# Patient Record
Sex: FEMALE | Race: WHITE | Employment: PART TIME | URBAN - METROPOLITAN AREA
[De-identification: names, ages, dates, MRNs, and addresses within clinical notes are randomized per-mention and may not be internally consistent; named-entity substitution may affect disease eponyms.]

---

## 2017-01-09 ENCOUNTER — HOSPITAL ENCOUNTER (OUTPATIENT)
Age: 32
Discharge: HOME | End: 2017-01-09
Attending: EMERGENCY MEDICINE
Payer: COMMERCIAL

## 2017-01-09 VITALS
HEART RATE: 81 BPM | WEIGHT: 120 LBS | HEIGHT: 64 IN | BODY MASS INDEX: 20.49 KG/M2 | OXYGEN SATURATION: 100 % | DIASTOLIC BLOOD PRESSURE: 78 MMHG | TEMPERATURE: 98 F | RESPIRATION RATE: 14 BRPM | SYSTOLIC BLOOD PRESSURE: 116 MMHG

## 2017-01-09 DIAGNOSIS — R10.32 LEFT LOWER QUADRANT PAIN: Primary | ICD-10-CM

## 2017-01-09 LAB
B-HCG UR QL: NEGATIVE
URINE BILIRUBIN: NEGATIVE
URINE BLOOD: NEGATIVE
URINE CLARITY: CLEAR
URINE COLOR: YELLOW
URINE GLUCOSE: NEGATIVE MG/DL
URINE KETONES: NEGATIVE MG/DL
URINE LEUKOCYTE ESTERASE: NEGATIVE
URINE NITRITE: NEGATIVE
URINE PH: 6.5
URINE PROTEIN: NEGATIVE MG /DL
URINE SPECIFIC GRAVITY: 1.01
URINE UROBILINOGEN: 0.2 MG/DL

## 2017-01-09 PROCEDURE — 99203 OFFICE O/P NEW LOW 30 MIN: CPT

## 2017-01-09 PROCEDURE — 81025 URINE PREGNANCY TEST: CPT

## 2017-01-09 PROCEDURE — 81002 URINALYSIS NONAUTO W/O SCOPE: CPT

## 2017-01-09 PROCEDURE — 99212 OFFICE O/P EST SF 10 MIN: CPT

## 2017-01-09 NOTE — ED PROVIDER NOTES
Patient Seen in: [address]    History   Patient presents with:  Abdomen/Flank Pain (GI/)    Stated Complaint: LT SIDE STOMACH PAIN    HPI    Patient is a 25-year-old female who complains of some left lower quadrant leslie Paternal Grandfather    • Cancer Paternal Grandmother          Smoking Status: Never Smoker                      Alcohol Use: Yes           0.0 oz/week       0 Standard drinks or equivalent per week       Comment: SS      Review of Systems    Positive for sensory defecits noted Coordination normal.   Skin: Skin is warm and dry. Psychiatric: Normal mood and affect. Behavior is normal. Judgment and thought content normal.   Nursing note and vitals reviewed.           ED Course     [facility]ed   [facility]

## 2017-01-10 ENCOUNTER — OFFICE VISIT (OUTPATIENT)
Dept: INTERNAL MEDICINE CLINIC | Facility: CLINIC | Age: 32
End: 2017-01-10

## 2017-01-10 VITALS
DIASTOLIC BLOOD PRESSURE: 66 MMHG | HEIGHT: 64 IN | OXYGEN SATURATION: 97 % | TEMPERATURE: 98 F | BODY MASS INDEX: 20.49 KG/M2 | SYSTOLIC BLOOD PRESSURE: 100 MMHG | HEART RATE: 85 BPM | WEIGHT: 120 LBS | RESPIRATION RATE: 17 BRPM

## 2017-01-10 DIAGNOSIS — R10.32 LLQ ABDOMINAL PAIN: Primary | ICD-10-CM

## 2017-01-10 LAB
ALBUMIN SERPL BCP-MCNC: 4.1 G/DL (ref 3.5–4.8)
ALBUMIN/GLOB SERPL: 2 {RATIO} (ref 1–2)
ALP SERPL-CCNC: 37 U/L (ref 32–100)
ALT SERPL-CCNC: 12 U/L (ref 14–54)
ANION GAP SERPL CALC-SCNC: 6 MMOL/L (ref 0–18)
AST SERPL-CCNC: 21 U/L (ref 15–41)
BASOPHILS # BLD: 0 K/UL (ref 0–0.2)
BASOPHILS NFR BLD: 1 %
BILIRUB SERPL-MCNC: 0.5 MG/DL (ref 0.3–1.2)
BUN SERPL-MCNC: 8 MG/DL (ref 8–20)
BUN/CREAT SERPL: 10.1 (ref 10–20)
CALCIUM SERPL-MCNC: 9 MG/DL (ref 8.5–10.5)
CHLORIDE SERPL-SCNC: 106 MMOL/L (ref 95–110)
CO2 SERPL-SCNC: 27 MMOL/L (ref 22–32)
CREAT SERPL-MCNC: 0.79 MG/DL (ref 0.5–1.5)
EOSINOPHIL # BLD: 0.1 K/UL (ref 0–0.7)
EOSINOPHIL NFR BLD: 1 %
ERYTHROCYTE [DISTWIDTH] IN BLOOD BY AUTOMATED COUNT: 15.4 % (ref 11–15)
ERYTHROCYTE [SEDIMENTATION RATE] IN BLOOD: 6 MM/HR (ref 0–20)
GLOBULIN PLAS-MCNC: 2.1 G/DL (ref 2.5–3.7)
GLUCOSE SERPL-MCNC: 89 MG/DL (ref 70–99)
HCT VFR BLD AUTO: 35.8 % (ref 35–48)
HGB BLD-MCNC: 11.5 G/DL (ref 12–16)
LYMPHOCYTES # BLD: 1.7 K/UL (ref 1–4)
LYMPHOCYTES NFR BLD: 25 %
MCH RBC QN AUTO: 24.6 PG (ref 27–32)
MCHC RBC AUTO-ENTMCNC: 32.3 G/DL (ref 32–37)
MCV RBC AUTO: 76.1 FL (ref 80–100)
MONOCYTES # BLD: 0.4 K/UL (ref 0–1)
MONOCYTES NFR BLD: 5 %
NEUTROPHILS # BLD AUTO: 4.7 K/UL (ref 1.8–7.7)
NEUTROPHILS NFR BLD: 68 %
OSMOLALITY UR CALC.SUM OF ELEC: 286 MOSM/KG (ref 275–295)
PLATELET # BLD AUTO: 333 K/UL (ref 140–400)
PMV BLD AUTO: 9.3 FL (ref 7.4–10.3)
POTASSIUM SERPL-SCNC: 3.6 MMOL/L (ref 3.3–5.1)
PROT SERPL-MCNC: 6.2 G/DL (ref 5.9–8.4)
RBC # BLD AUTO: 4.7 M/UL (ref 3.7–5.4)
SODIUM SERPL-SCNC: 139 MMOL/L (ref 136–144)
WBC # BLD AUTO: 6.8 K/UL (ref 4–11)

## 2017-01-10 PROCEDURE — 99203 OFFICE O/P NEW LOW 30 MIN: CPT | Performed by: INTERNAL MEDICINE

## 2017-01-10 PROCEDURE — 36415 COLL VENOUS BLD VENIPUNCTURE: CPT | Performed by: INTERNAL MEDICINE

## 2017-01-10 PROCEDURE — 85652 RBC SED RATE AUTOMATED: CPT | Performed by: INTERNAL MEDICINE

## 2017-01-10 PROCEDURE — 80053 COMPREHEN METABOLIC PANEL: CPT | Performed by: INTERNAL MEDICINE

## 2017-01-10 PROCEDURE — 85025 COMPLETE CBC W/AUTO DIFF WBC: CPT | Performed by: INTERNAL MEDICINE

## 2017-01-10 RX ORDER — PRENATAL VIT/IRON FUM/FOLIC AC 27MG-0.8MG
1 TABLET ORAL DAILY
COMMUNITY
End: 2019-02-26

## 2017-01-10 NOTE — PROGRESS NOTES
HPI:    Patient ID: Kelvin Sweeney is a 33year old female. HPI    Patient is a dietician. Presents with LlQ pain for 3 days, very tender. A lot of bloating. Denies diarrhea, fever, nausea , vomiting.  BM normal.   Started high fiber diet and gradua to person, place, and time. Skin: No rash noted. Psychiatric: Judgment normal.   Nursing note and vitals reviewed.              ASSESSMENT/PLAN:   Llq abdominal pain  (primary encounter diagnosis)      DDX : Mild diverticulitis vs colitis vs Flare of L

## 2017-01-10 NOTE — PROGRESS NOTES
Patient presented in office today for fasting blood draw. Blood draw successful in left arm  Colby:  Cbc,cmp,esr  D. O.B verified   Orders per Doctor Co

## 2017-01-12 ENCOUNTER — TELEPHONE (OUTPATIENT)
Dept: INTERNAL MEDICINE CLINIC | Facility: CLINIC | Age: 32
End: 2017-01-12

## 2017-01-12 DIAGNOSIS — R10.9 LEFT SIDED ABDOMINAL PAIN: Primary | ICD-10-CM

## 2017-01-12 DIAGNOSIS — R10.9 LEFT LATERAL ABDOMINAL PAIN: Primary | ICD-10-CM

## 2017-02-02 PROCEDURE — 88175 CYTOPATH C/V AUTO FLUID REDO: CPT | Performed by: OBSTETRICS & GYNECOLOGY

## 2017-04-13 ENCOUNTER — LAB ENCOUNTER (OUTPATIENT)
Dept: LAB | Facility: HOSPITAL | Age: 32
End: 2017-04-13
Attending: OBSTETRICS & GYNECOLOGY
Payer: COMMERCIAL

## 2017-04-13 DIAGNOSIS — Z12.4 SCREENING FOR CERVICAL CANCER: ICD-10-CM

## 2017-04-13 DIAGNOSIS — Z34.80 SUPERVISION OF OTHER NORMAL PREGNANCY, ANTEPARTUM: ICD-10-CM

## 2017-04-13 DIAGNOSIS — Z34.81 ENCOUNTER FOR SUPERVISION OF OTHER NORMAL PREGNANCY IN FIRST TRIMESTER: ICD-10-CM

## 2017-04-13 PROCEDURE — 86762 RUBELLA ANTIBODY: CPT

## 2017-04-13 PROCEDURE — 86901 BLOOD TYPING SEROLOGIC RH(D): CPT

## 2017-04-13 PROCEDURE — 36415 COLL VENOUS BLD VENIPUNCTURE: CPT

## 2017-04-13 PROCEDURE — 86900 BLOOD TYPING SEROLOGIC ABO: CPT

## 2017-04-13 PROCEDURE — 86780 TREPONEMA PALLIDUM: CPT

## 2017-04-13 PROCEDURE — 85025 COMPLETE CBC W/AUTO DIFF WBC: CPT

## 2017-04-13 PROCEDURE — 87086 URINE CULTURE/COLONY COUNT: CPT

## 2017-04-13 PROCEDURE — 87340 HEPATITIS B SURFACE AG IA: CPT

## 2017-04-13 PROCEDURE — 87389 HIV-1 AG W/HIV-1&-2 AB AG IA: CPT

## 2017-04-13 PROCEDURE — 87491 CHLMYD TRACH DNA AMP PROBE: CPT

## 2017-04-13 PROCEDURE — 86850 RBC ANTIBODY SCREEN: CPT

## 2017-04-13 PROCEDURE — 87591 N.GONORRHOEAE DNA AMP PROB: CPT

## 2017-04-21 ENCOUNTER — HOSPITAL ENCOUNTER (OUTPATIENT)
Dept: ULTRASOUND IMAGING | Age: 32
Discharge: HOME | End: 2017-04-21
Attending: INTERNAL MEDICINE
Payer: COMMERCIAL

## 2017-04-21 DIAGNOSIS — R10.13 EPIGASTRIC PAIN: ICD-10-CM

## 2017-04-21 DIAGNOSIS — R10.9 LEFT SIDED ABDOMINAL PAIN: ICD-10-CM

## 2017-04-21 DIAGNOSIS — R10.9 LEFT LATERAL ABDOMINAL PAIN: ICD-10-CM

## 2017-04-21 PROCEDURE — 76700 US EXAM ABDOM COMPLETE: CPT

## 2017-04-26 PROBLEM — O99.019 ANEMIA AFFECTING PREGNANCY (HCC): Status: ACTIVE | Noted: 2017-04-26

## 2017-04-26 PROBLEM — O99.019 ANEMIA AFFECTING PREGNANCY: Status: ACTIVE | Noted: 2017-04-26

## 2017-05-10 ENCOUNTER — APPOINTMENT (OUTPATIENT)
Dept: MRI IMAGING | Facility: HOSPITAL | Age: 32
End: 2017-05-10
Attending: SURGERY
Payer: COMMERCIAL

## 2017-05-10 ENCOUNTER — LAB ENCOUNTER (OUTPATIENT)
Dept: LAB | Facility: HOSPITAL | Age: 32
End: 2017-05-10
Attending: OBSTETRICS & GYNECOLOGY
Payer: COMMERCIAL

## 2017-05-10 ENCOUNTER — HOSPITAL ENCOUNTER (OUTPATIENT)
Dept: MRI IMAGING | Facility: HOSPITAL | Age: 32
Discharge: HOME | End: 2017-05-10
Attending: SURGERY
Payer: COMMERCIAL

## 2017-05-10 DIAGNOSIS — R10.31 RLQ ABDOMINAL PAIN: ICD-10-CM

## 2017-05-10 DIAGNOSIS — R10.813 RIGHT LOWER QUADRANT ABDOMINAL TENDERNESS, REBOUND TENDERNESS PRESENCE NOT SPECIFIED: ICD-10-CM

## 2017-05-10 PROCEDURE — 85025 COMPLETE CBC W/AUTO DIFF WBC: CPT

## 2017-05-10 PROCEDURE — 36415 COLL VENOUS BLD VENIPUNCTURE: CPT

## 2017-05-10 PROCEDURE — 80053 COMPREHEN METABOLIC PANEL: CPT

## 2017-05-10 PROCEDURE — 81001 URINALYSIS AUTO W/SCOPE: CPT

## 2017-05-10 PROCEDURE — 74181 MRI ABDOMEN W/O CONTRAST: CPT | Performed by: SURGERY

## 2017-05-10 PROCEDURE — 72195 MRI PELVIS W/O DYE: CPT | Performed by: SURGERY

## 2017-05-10 PROCEDURE — 83690 ASSAY OF LIPASE: CPT

## 2017-05-10 PROCEDURE — 82150 ASSAY OF AMYLASE: CPT

## 2017-06-27 ENCOUNTER — HOSPITAL ENCOUNTER (OUTPATIENT)
Dept: ULTRASOUND IMAGING | Facility: HOSPITAL | Age: 32
Discharge: HOME | End: 2017-06-27
Attending: OBSTETRICS & GYNECOLOGY
Payer: COMMERCIAL

## 2017-06-27 DIAGNOSIS — Z34.82 ENCOUNTER FOR SUPERVISION OF OTHER NORMAL PREGNANCY IN SECOND TRIMESTER: ICD-10-CM

## 2017-06-27 PROCEDURE — 76805 OB US >/= 14 WKS SNGL FETUS: CPT | Performed by: OBSTETRICS & GYNECOLOGY

## 2017-07-20 ENCOUNTER — OFFICE VISIT (OUTPATIENT)
Dept: FAMILY MEDICINE CLINIC | Facility: CLINIC | Age: 32
End: 2017-07-20

## 2017-07-20 VITALS
TEMPERATURE: 98 F | HEART RATE: 80 BPM | SYSTOLIC BLOOD PRESSURE: 98 MMHG | OXYGEN SATURATION: 98 % | DIASTOLIC BLOOD PRESSURE: 56 MMHG

## 2017-07-20 DIAGNOSIS — J06.9 VIRAL UPPER RESPIRATORY TRACT INFECTION WITH COUGH: Primary | ICD-10-CM

## 2017-07-20 PROCEDURE — 99213 OFFICE O/P EST LOW 20 MIN: CPT | Performed by: PHYSICIAN ASSISTANT

## 2017-07-20 NOTE — PROGRESS NOTES
CHIEF COMPLAINT:   Patient presents with:  Cough/URI      HPI:   Norma Hopson is a 33year old female who presents for upper respiratory symptoms for  7 days.  Patient reports sore throat only at the beginning of sx's, congestion, cough with pale colo NEURO: Denies headaches    EXAM:   BP 98/56   Pulse 80   Temp 98.3 °F (36.8 °C)   LMP 02/04/2017   SpO2 98%   GENERAL: well developed, well nourished,in no apparent distress  SKIN: no rashes,no suspicious lesions  HEAD: atraumatic, normocephalic.  no tende You have a viral upper respiratory illness (URI), which is another term for the common cold. This illness is contagious during the first few days. It is spread through the air by coughing and sneezing.  It may also be spread by direct contact (touching the · Cough with lots of colored sputum (mucus)  · Severe headache; face, neck, or ear pain  · Difficulty swallowing due to throat pain  · Fever of 100.4°F (38°C)  Call 911, or get immediate medical care  Call emergency services right away if any of these occu

## 2017-08-01 ENCOUNTER — HOSPITAL ENCOUNTER (OUTPATIENT)
Facility: HOSPITAL | Age: 32
Setting detail: OBSERVATION
Discharge: HOME | End: 2017-08-01
Attending: OBSTETRICS & GYNECOLOGY | Admitting: OBSTETRICS & GYNECOLOGY
Payer: COMMERCIAL

## 2017-08-01 VITALS
HEART RATE: 89 BPM | SYSTOLIC BLOOD PRESSURE: 106 MMHG | DIASTOLIC BLOOD PRESSURE: 61 MMHG | RESPIRATION RATE: 16 BRPM | TEMPERATURE: 99 F

## 2017-08-01 PROBLEM — R10.9 ABDOMINAL PAIN IN PREGNANCY: Status: ACTIVE | Noted: 2017-08-01

## 2017-08-01 PROBLEM — R10.9 ABDOMINAL PAIN IN PREGNANCY (HCC): Status: ACTIVE | Noted: 2017-08-01

## 2017-08-01 PROBLEM — O26.899 ABDOMINAL PAIN IN PREGNANCY (HCC): Status: ACTIVE | Noted: 2017-08-01

## 2017-08-01 PROBLEM — O26.899 ABDOMINAL PAIN IN PREGNANCY: Status: ACTIVE | Noted: 2017-08-01

## 2017-08-01 PROCEDURE — 99213 OFFICE O/P EST LOW 20 MIN: CPT

## 2017-08-02 NOTE — TRIAGE
[facility]      Triage Note    Carolyn Stanley Patient Status:  Observation    1985 MRN [id number]   Location [address]  Attending Marilyn Aguilar MD   Hosp Day # 0 PCP MD Tunde Kelly BP: 106/61   BP Location: Left arm   Pulse: 89   Resp: 16   Temp: 98.8 °F (37.1 °C)   TempSrc: Oral       NST   Variability: Moderate           Accelerations: Yes           Decelerations: None            Baseline: 150 BPM           Uterine Irritability:

## 2017-08-02 NOTE — PROGRESS NOTES
Pt presented to triage ambulatory with c/o abdominal pain and pressure.   PT states that [place] and the \"huge bucket of water fell on her abdomen\"  Pt states she has felt pelvic pressure and abdominal pain that she describes a

## 2017-08-17 ENCOUNTER — LAB ENCOUNTER (OUTPATIENT)
Dept: LAB | Facility: HOSPITAL | Age: 32
End: 2017-08-17
Attending: OBSTETRICS & GYNECOLOGY
Payer: COMMERCIAL

## 2017-08-17 DIAGNOSIS — Z34.03 ENCOUNTER FOR SUPERVISION OF NORMAL FIRST PREGNANCY IN THIRD TRIMESTER: ICD-10-CM

## 2017-08-17 LAB — GLUCOSE 1H P 50 G GLC PO SERPL-MCNC: 120 MG/DL

## 2017-08-17 PROCEDURE — 86780 TREPONEMA PALLIDUM: CPT | Performed by: OBSTETRICS & GYNECOLOGY

## 2017-08-17 PROCEDURE — 82950 GLUCOSE TEST: CPT

## 2017-08-17 PROCEDURE — 85025 COMPLETE CBC W/AUTO DIFF WBC: CPT | Performed by: OBSTETRICS & GYNECOLOGY

## 2017-08-17 PROCEDURE — 36415 COLL VENOUS BLD VENIPUNCTURE: CPT | Performed by: OBSTETRICS & GYNECOLOGY

## 2017-08-17 PROCEDURE — 36415 COLL VENOUS BLD VENIPUNCTURE: CPT

## 2017-08-31 ENCOUNTER — LAB ENCOUNTER (OUTPATIENT)
Dept: LAB | Facility: HOSPITAL | Age: 32
End: 2017-08-31
Attending: INTERNAL MEDICINE
Payer: COMMERCIAL

## 2017-08-31 DIAGNOSIS — D50.9 MICROCYTIC ANEMIA: ICD-10-CM

## 2017-08-31 LAB
BASOPHILS # BLD: 0 K/UL (ref 0–0.2)
BASOPHILS NFR BLD: 0 %
EOSINOPHIL # BLD: 0.1 K/UL (ref 0–0.7)
EOSINOPHIL NFR BLD: 1 %
ERYTHROCYTE [DISTWIDTH] IN BLOOD BY AUTOMATED COUNT: 16.2 % (ref 11–15)
FOLATE SERPL-MCNC: >23.3 NG/ML
HCT VFR BLD AUTO: 29.1 % (ref 35–48)
HGB BLD-MCNC: 9.4 G/DL (ref 12–16)
IGA SERPL-MCNC: <61 MG/DL (ref 68–378)
IRON SATN MFR SERPL: 7 % (ref 15–50)
IRON SERPL-MCNC: 40 MCG/DL (ref 28–170)
LYMPHOCYTES # BLD: 1.5 K/UL (ref 1–4)
LYMPHOCYTES NFR BLD: 13 %
MCH RBC QN AUTO: 24.1 PG (ref 27–32)
MCHC RBC AUTO-ENTMCNC: 32.2 G/DL (ref 32–37)
MCV RBC AUTO: 74.9 FL (ref 80–100)
MONOCYTES # BLD: 0.5 K/UL (ref 0–1)
MONOCYTES NFR BLD: 4 %
NEUTROPHILS # BLD AUTO: 9.2 K/UL (ref 1.8–7.7)
NEUTROPHILS NFR BLD: 82 %
PLATELET # BLD AUTO: 251 K/UL (ref 140–400)
PMV BLD AUTO: 9.1 FL (ref 7.4–10.3)
RBC # BLD AUTO: 3.89 M/UL (ref 3.7–5.4)
TIBC SERPL-MCNC: 591 MCG/DL (ref 228–428)
TRANSFERRIN SERPL-MCNC: 448 MG/DL (ref 192–382)
VIT B12 SERPL-MCNC: 284 PG/ML (ref 181–914)
WBC # BLD AUTO: 11.2 K/UL (ref 4–11)

## 2017-08-31 PROCEDURE — 36415 COLL VENOUS BLD VENIPUNCTURE: CPT

## 2017-08-31 PROCEDURE — 85025 COMPLETE CBC W/AUTO DIFF WBC: CPT

## 2017-08-31 PROCEDURE — 83540 ASSAY OF IRON: CPT

## 2017-08-31 PROCEDURE — 82784 ASSAY IGA/IGD/IGG/IGM EACH: CPT

## 2017-08-31 PROCEDURE — 83516 IMMUNOASSAY NONANTIBODY: CPT

## 2017-08-31 PROCEDURE — 82607 VITAMIN B-12: CPT

## 2017-08-31 PROCEDURE — 82746 ASSAY OF FOLIC ACID SERUM: CPT

## 2017-08-31 PROCEDURE — 84466 ASSAY OF TRANSFERRIN: CPT

## 2017-09-01 LAB — TTG IGA SER-ACNC: <0.1 U/ML (ref ?–7)

## 2017-09-19 ENCOUNTER — LAB ENCOUNTER (OUTPATIENT)
Dept: LAB | Facility: HOSPITAL | Age: 32
End: 2017-09-19
Attending: OBSTETRICS & GYNECOLOGY
Payer: COMMERCIAL

## 2017-09-19 DIAGNOSIS — Z34.02 ENCOUNTER FOR SUPERVISION OF NORMAL FIRST PREGNANCY IN SECOND TRIMESTER: ICD-10-CM

## 2017-09-19 LAB
BASOPHILS # BLD: 0 K/UL (ref 0–0.2)
BASOPHILS NFR BLD: 0 %
EOSINOPHIL # BLD: 0.1 K/UL (ref 0–0.7)
EOSINOPHIL NFR BLD: 1 %
ERYTHROCYTE [DISTWIDTH] IN BLOOD BY AUTOMATED COUNT: 18 % (ref 11–15)
HCT VFR BLD AUTO: 30.2 % (ref 35–48)
HGB BLD-MCNC: 9.8 G/DL (ref 12–16)
LYMPHOCYTES # BLD: 1.5 K/UL (ref 1–4)
LYMPHOCYTES NFR BLD: 13 %
MCH RBC QN AUTO: 24.4 PG (ref 27–32)
MCHC RBC AUTO-ENTMCNC: 32.3 G/DL (ref 32–37)
MCV RBC AUTO: 75.5 FL (ref 80–100)
MONOCYTES # BLD: 0.5 K/UL (ref 0–1)
MONOCYTES NFR BLD: 5 %
NEUTROPHILS # BLD AUTO: 9.1 K/UL (ref 1.8–7.7)
NEUTROPHILS NFR BLD: 81 %
PLATELET # BLD AUTO: 253 K/UL (ref 140–400)
PMV BLD AUTO: 8.7 FL (ref 7.4–10.3)
RBC # BLD AUTO: 4 M/UL (ref 3.7–5.4)
WBC # BLD AUTO: 11.2 K/UL (ref 4–11)

## 2017-09-19 PROCEDURE — 36415 COLL VENOUS BLD VENIPUNCTURE: CPT

## 2017-09-19 PROCEDURE — 85025 COMPLETE CBC W/AUTO DIFF WBC: CPT

## 2017-09-26 ENCOUNTER — HOSPITAL ENCOUNTER (OUTPATIENT)
Dept: ULTRASOUND IMAGING | Facility: HOSPITAL | Age: 32
Discharge: HOME | End: 2017-09-26
Attending: OBSTETRICS & GYNECOLOGY
Payer: COMMERCIAL

## 2017-09-26 DIAGNOSIS — O26.843 UTERINE SIZE-DATE DISCREPANCY IN THIRD TRIMESTER: ICD-10-CM

## 2017-09-26 PROCEDURE — 76816 OB US FOLLOW-UP PER FETUS: CPT | Performed by: OBSTETRICS & GYNECOLOGY

## 2017-10-19 PROBLEM — K64.5 THROMBOSED EXTERNAL HEMORRHOID: Status: ACTIVE | Noted: 2017-10-19

## 2017-10-19 PROCEDURE — 87653 STREP B DNA AMP PROBE: CPT | Performed by: OBSTETRICS & GYNECOLOGY

## 2017-10-19 PROCEDURE — 87081 CULTURE SCREEN ONLY: CPT | Performed by: OBSTETRICS & GYNECOLOGY

## 2017-11-09 ENCOUNTER — ANESTHESIA (OUTPATIENT)
Dept: OBGYN UNIT | Facility: HOSPITAL | Age: 32
End: 2017-11-09
Payer: COMMERCIAL

## 2017-11-09 ENCOUNTER — HOSPITAL ENCOUNTER (INPATIENT)
Facility: HOSPITAL | Age: 32
LOS: 2 days | Discharge: HOME | End: 2017-11-11
Attending: OBSTETRICS & GYNECOLOGY | Admitting: OBSTETRICS & GYNECOLOGY
Payer: COMMERCIAL

## 2017-11-09 ENCOUNTER — ANESTHESIA EVENT (OUTPATIENT)
Dept: OBGYN UNIT | Facility: HOSPITAL | Age: 32
End: 2017-11-09
Payer: COMMERCIAL

## 2017-11-09 PROBLEM — O42.90 AMNIOTIC FLUID LEAKING (HCC): Status: ACTIVE | Noted: 2017-11-09

## 2017-11-09 PROBLEM — O99.019 ANEMIA AFFECTING PREGNANCY: Status: RESOLVED | Noted: 2017-04-26 | Resolved: 2017-11-09

## 2017-11-09 PROBLEM — O42.90 AMNIOTIC FLUID LEAKING: Status: RESOLVED | Noted: 2017-11-09 | Resolved: 2017-11-09

## 2017-11-09 PROBLEM — R10.9 ABDOMINAL PAIN IN PREGNANCY (HCC): Status: RESOLVED | Noted: 2017-08-01 | Resolved: 2017-11-09

## 2017-11-09 PROBLEM — O26.899 ABDOMINAL PAIN IN PREGNANCY (HCC): Status: RESOLVED | Noted: 2017-08-01 | Resolved: 2017-11-09

## 2017-11-09 PROBLEM — R10.9 ABDOMINAL PAIN IN PREGNANCY: Status: RESOLVED | Noted: 2017-08-01 | Resolved: 2017-11-09

## 2017-11-09 PROBLEM — O99.019 ANEMIA AFFECTING PREGNANCY (HCC): Status: RESOLVED | Noted: 2017-04-26 | Resolved: 2017-11-09

## 2017-11-09 PROBLEM — O42.90 AMNIOTIC FLUID LEAKING: Status: ACTIVE | Noted: 2017-11-09

## 2017-11-09 PROBLEM — O42.90 AMNIOTIC FLUID LEAKING (HCC): Status: RESOLVED | Noted: 2017-11-09 | Resolved: 2017-11-09

## 2017-11-09 PROBLEM — O26.899 ABDOMINAL PAIN IN PREGNANCY: Status: RESOLVED | Noted: 2017-08-01 | Resolved: 2017-11-09

## 2017-11-09 PROCEDURE — 85027 COMPLETE CBC AUTOMATED: CPT | Performed by: OBSTETRICS & GYNECOLOGY

## 2017-11-09 PROCEDURE — 86900 BLOOD TYPING SEROLOGIC ABO: CPT | Performed by: OBSTETRICS & GYNECOLOGY

## 2017-11-09 PROCEDURE — 86901 BLOOD TYPING SEROLOGIC RH(D): CPT | Performed by: OBSTETRICS & GYNECOLOGY

## 2017-11-09 PROCEDURE — 51702 INSERT TEMP BLADDER CATH: CPT

## 2017-11-09 PROCEDURE — 0KQM0ZZ REPAIR PERINEUM MUSCLE, OPEN APPROACH: ICD-10-PCS | Performed by: OBSTETRICS & GYNECOLOGY

## 2017-11-09 PROCEDURE — 36415 COLL VENOUS BLD VENIPUNCTURE: CPT

## 2017-11-09 PROCEDURE — 86850 RBC ANTIBODY SCREEN: CPT | Performed by: OBSTETRICS & GYNECOLOGY

## 2017-11-09 RX ORDER — IBUPROFEN 200 MG
200 TABLET ORAL EVERY 4 HOURS PRN
Status: DISCONTINUED | OUTPATIENT
Start: 2017-11-09 | End: 2017-11-11

## 2017-11-09 RX ORDER — SODIUM CHLORIDE 0.9 % (FLUSH) 0.9 %
10 SYRINGE (ML) INJECTION AS NEEDED
Status: DISCONTINUED | OUTPATIENT
Start: 2017-11-09 | End: 2017-11-09 | Stop reason: HOSPADM

## 2017-11-09 RX ORDER — NALBUPHINE HCL 10 MG/ML
2.5 AMPUL (ML) INJECTION
Status: DISCONTINUED | OUTPATIENT
Start: 2017-11-09 | End: 2017-11-11

## 2017-11-09 RX ORDER — TRISODIUM CITRATE DIHYDRATE AND CITRIC ACID MONOHYDRATE 500; 334 MG/5ML; MG/5ML
30 SOLUTION ORAL AS NEEDED
Status: DISCONTINUED | OUTPATIENT
Start: 2017-11-09 | End: 2017-11-09 | Stop reason: HOSPADM

## 2017-11-09 RX ORDER — SODIUM CHLORIDE, SODIUM LACTATE, POTASSIUM CHLORIDE, CALCIUM CHLORIDE 600; 310; 30; 20 MG/100ML; MG/100ML; MG/100ML; MG/100ML
INJECTION, SOLUTION INTRAVENOUS
Status: COMPLETED
Start: 2017-11-09 | End: 2017-11-09

## 2017-11-09 RX ORDER — BUPIVACAINE HYDROCHLORIDE 2.5 MG/ML
INJECTION, SOLUTION EPIDURAL; INFILTRATION; INTRACAUDAL AS NEEDED
Status: DISCONTINUED | OUTPATIENT
Start: 2017-11-09 | End: 2017-11-09 | Stop reason: SURG

## 2017-11-09 RX ORDER — ONDANSETRON 2 MG/ML
4 INJECTION INTRAMUSCULAR; INTRAVENOUS EVERY 6 HOURS PRN
Status: DISCONTINUED | OUTPATIENT
Start: 2017-11-09 | End: 2017-11-09 | Stop reason: HOSPADM

## 2017-11-09 RX ORDER — BISACODYL 10 MG
10 SUPPOSITORY, RECTAL RECTAL ONCE AS NEEDED
Status: DISCONTINUED | OUTPATIENT
Start: 2017-11-09 | End: 2017-11-11

## 2017-11-09 RX ORDER — AMMONIA INHALANTS 0.04 G/.3ML
0.3 INHALANT RESPIRATORY (INHALATION) AS NEEDED
Status: DISCONTINUED | OUTPATIENT
Start: 2017-11-09 | End: 2017-11-09 | Stop reason: HOSPADM

## 2017-11-09 RX ORDER — PHENYLEPHRINE HCL IN 0.9% NACL 0.5 MG/5ML
SYRINGE (ML) INTRAVENOUS
Status: DISCONTINUED
Start: 2017-11-09 | End: 2017-11-09 | Stop reason: WASHOUT

## 2017-11-09 RX ORDER — DEXTROSE, SODIUM CHLORIDE, SODIUM LACTATE, POTASSIUM CHLORIDE, AND CALCIUM CHLORIDE 5; .6; .31; .03; .02 G/100ML; G/100ML; G/100ML; G/100ML; G/100ML
125 INJECTION, SOLUTION INTRAVENOUS CONTINUOUS
Status: DISCONTINUED | OUTPATIENT
Start: 2017-11-09 | End: 2017-11-09 | Stop reason: HOSPADM

## 2017-11-09 RX ORDER — LIDOCAINE HYDROCHLORIDE 10 MG/ML
INJECTION, SOLUTION EPIDURAL; INFILTRATION; INTRACAUDAL; PERINEURAL AS NEEDED
Status: DISCONTINUED | OUTPATIENT
Start: 2017-11-09 | End: 2017-11-09 | Stop reason: SURG

## 2017-11-09 RX ORDER — IBUPROFEN 600 MG/1
600 TABLET ORAL ONCE AS NEEDED
Status: DISCONTINUED | OUTPATIENT
Start: 2017-11-09 | End: 2017-11-09 | Stop reason: HOSPADM

## 2017-11-09 RX ORDER — PRENATAL VIT,CAL 76/IRON/FOLIC 29 MG-1 MG
1 TABLET ORAL DAILY
Status: DISCONTINUED | OUTPATIENT
Start: 2017-11-09 | End: 2017-11-11

## 2017-11-09 RX ORDER — DEXTROSE, SODIUM CHLORIDE, SODIUM LACTATE, POTASSIUM CHLORIDE, AND CALCIUM CHLORIDE 5; .6; .31; .03; .02 G/100ML; G/100ML; G/100ML; G/100ML; G/100ML
INJECTION, SOLUTION INTRAVENOUS
Status: COMPLETED
Start: 2017-11-09 | End: 2017-11-09

## 2017-11-09 RX ORDER — ONDANSETRON 2 MG/ML
4 INJECTION INTRAMUSCULAR; INTRAVENOUS EVERY 6 HOURS PRN
Status: DISCONTINUED | OUTPATIENT
Start: 2017-11-09 | End: 2017-11-11

## 2017-11-09 RX ORDER — NICOTINE POLACRILEX 4 MG/1
20 GUM, CHEWING ORAL DAILY
Status: ON HOLD | COMMUNITY
End: 2017-11-09

## 2017-11-09 RX ORDER — IBUPROFEN 600 MG/1
600 TABLET ORAL EVERY 4 HOURS PRN
Status: DISCONTINUED | OUTPATIENT
Start: 2017-11-09 | End: 2017-11-11

## 2017-11-09 RX ORDER — AMMONIA INHALANTS 0.04 G/.3ML
0.3 INHALANT RESPIRATORY (INHALATION) AS NEEDED
Status: DISCONTINUED | OUTPATIENT
Start: 2017-11-09 | End: 2017-11-11

## 2017-11-09 RX ORDER — DOCUSATE SODIUM 100 MG/1
100 CAPSULE, LIQUID FILLED ORAL 2 TIMES DAILY
Status: DISCONTINUED | OUTPATIENT
Start: 2017-11-09 | End: 2017-11-11

## 2017-11-09 RX ORDER — LIDOCAINE HYDROCHLORIDE 10 MG/ML
30 INJECTION, SOLUTION EPIDURAL; INFILTRATION; INTRACAUDAL; PERINEURAL ONCE
Status: DISCONTINUED | OUTPATIENT
Start: 2017-11-09 | End: 2017-11-09 | Stop reason: HOSPADM

## 2017-11-09 RX ORDER — EPHEDRINE SULFATE/0.9% NACL/PF 25 MG/5 ML
SYRINGE (ML) INTRAVENOUS
Status: DISPENSED
Start: 2017-11-09 | End: 2017-11-09

## 2017-11-09 RX ORDER — IBUPROFEN 200 MG
400 TABLET ORAL EVERY 4 HOURS PRN
Status: DISCONTINUED | OUTPATIENT
Start: 2017-11-09 | End: 2017-11-11

## 2017-11-09 RX ORDER — BUPIVACAINE HYDROCHLORIDE 2.5 MG/ML
INJECTION, SOLUTION EPIDURAL; INFILTRATION; INTRACAUDAL
Status: DISPENSED
Start: 2017-11-09 | End: 2017-11-09

## 2017-11-09 RX ORDER — PANTOPRAZOLE SODIUM 20 MG/1
20 TABLET, DELAYED RELEASE ORAL EVERY MORNING
Status: DISCONTINUED | OUTPATIENT
Start: 2017-11-09 | End: 2017-11-11

## 2017-11-09 RX ORDER — SIMETHICONE 80 MG
80 TABLET,CHEWABLE ORAL 3 TIMES DAILY PRN
Status: DISCONTINUED | OUTPATIENT
Start: 2017-11-09 | End: 2017-11-11

## 2017-11-09 RX ORDER — TERBUTALINE SULFATE 1 MG/ML
0.25 INJECTION, SOLUTION SUBCUTANEOUS AS NEEDED
Status: DISCONTINUED | OUTPATIENT
Start: 2017-11-09 | End: 2017-11-09 | Stop reason: HOSPADM

## 2017-11-09 RX ORDER — EPHEDRINE SULFATE/0.9% NACL/PF 25 MG/5 ML
5 SYRINGE (ML) INTRAVENOUS AS NEEDED
Status: DISCONTINUED | OUTPATIENT
Start: 2017-11-09 | End: 2017-11-09

## 2017-11-09 RX ADMIN — BUPIVACAINE HYDROCHLORIDE 10 ML: 2.5 INJECTION, SOLUTION EPIDURAL; INFILTRATION; INTRACAUDAL at 09:11:00

## 2017-11-09 RX ADMIN — LIDOCAINE HYDROCHLORIDE 1 ML: 10 INJECTION, SOLUTION EPIDURAL; INFILTRATION; INTRACAUDAL; PERINEURAL at 09:00:00

## 2017-11-09 RX ADMIN — BUPIVACAINE HYDROCHLORIDE 10 ML: 2.5 INJECTION, SOLUTION EPIDURAL; INFILTRATION; INTRACAUDAL at 09:12:00

## 2017-11-09 NOTE — ANESTHESIA PREPROCEDURE EVALUATION
Anesthesia PreOp Note    HPI:     Quincy Valderrama is a 27year old female who presents for preoperative consultation requested by: * No surgeons listed *    Date of Surgery:     * No procedures listed *  Indication: * No pre-op diagnosis entered * mm      Prescriptions Prior to Admission:  Ferrous Fumarate (IRON) 18 MG Oral Tab CR Take 6 tablets by mouth daily. Disp:  Rfl:  11/8/2017 at 1500   Docusate Sodium (COLACE OR) Take 2 tablets by mouth daily.  Disp:  Rfl:  11/8/2017 at 0900   OMEPRAZOLE 20 M Comment:Adamaris upset  Penicillins             Hives    Family History   Problem Relation Age of Onset   • Lipids Father    • Hypertension Father    • MS [OTHER] Father    • Cancer Mother      Hodgkins   • Diabetes Maternal Grandfather    • Cancer Maternal [facility] [place] exam             Anesthesia Plan:   ASA:  2  Plan:   Epidural  Informed Consent Plan and Risks Discussed With:  Patient  Use of Blood Products Discussed With:  Patient      I have informed Susan Stanley  of the nature of the anesthetic plan, benefits,

## 2017-11-09 NOTE — L&D DELIVERY NOTE
Massimo Stanley  [[id number]]     Labor Events     labor?:  No    steroids?:  None   Antibiotics received during labor?:  No   Antibiotics (enter # doses in comment):  none   Rupture date:  17   Rupture time:  0400   Rupture type:  SRO Intact   Disposition:  Discarded          Apgars    Living status:  Living   Apgar Scoring Key:     0 1 2    Skin color Blue or pale Acrocyanotic Completely pink    Heart rate Absent <100 bpm >100 bpm    Reflex irritability No response Grimace Cry or activ

## 2017-11-09 NOTE — PROGRESS NOTES
Pt tx to PP room 352 via w/c in stable condition holding infant. Belongings to room. Accompanied by sig other.  Report given to PRITI Bravo

## 2017-11-09 NOTE — ANESTHESIA POSTPROCEDURE EVALUATION
Patient: Felicita Ventura Lizeth    Procedure Summary     Date:  11/09/17 Room / Location:      Anesthesia Start:  0900 Anesthesia Stop:  2111    Procedure:  LABOR ANALGESIA Diagnosis:      Scheduled Providers:   Anesthesiologist:  Daniel Raid, MD Hiram Gitelman

## 2017-11-09 NOTE — PROGRESS NOTES
Pt. Received into room   [address] [facility] [address] chair   . Bedside report received from   [address] [place] [address].  Pt. Transferred to bed from  LDR 3       . Bed in locked position and lowest level. Side rails up x 2.  Vital signs within normal limits,

## 2017-11-09 NOTE — H&P
[facility]    History & Physical    Lisseth Leaks Lizeth Patient Status:  Outpatient    1985 MRN [id number]   Location [address]  Attending Chloe Young MD   Hosp Day # 0 PCP Iris Stern MD     Date of RIGHT      Comment: 2 x's  No date: OTHER SURGICAL HISTORY      Comment: benign right breast mass excision, two benign                left breast tumors removed  No date: OTHER SURGICAL HISTORY      Comment: wisdom teeth  No date: OTHER SURGICAL HISTORY fL   MCH 25.1 (L) 27.0 - 32.0 pg   MCHC 32.2 32.0 - 37.0 g/dl   RDW 18.6 (H) 11.0 - 15.0 %    140 - 400 K/UL   MPV 8.8 7.4 - 10.3 fL   -ABORH (BLOOD TYPE)   Collection Time: 11/09/17  6:12 AM   Result Value Ref Range   ABO BLOOD TYPE O    RH BLOOD T

## 2017-11-09 NOTE — ANESTHESIA PROCEDURE NOTES
Labor Analgesia  Performed by: Matthias Cons by: Delfina Osborne     Patient Location:  OB  Start Time:  11/9/2017 9:05 AM  End Time:  11/9/2017 9:10 AM  Site Identification: surface landmarks    Anesthesiologist:  Delfina Osborne  Performed by:   Geovanna

## 2017-11-09 NOTE — PROGRESS NOTES
Pt called this RN to room. Stated she felt light headed and SOB and felt like numbness was going up her back. Dr Jimmy Kwon called to bedside at [phone]. Gave 5mg ephedrine and hung 1L LR bolus. This RN remains at bedside at this time.

## 2017-11-09 NOTE — PROGRESS NOTES
Pt up to BR with assist x 2. Ambulated with slow steady gait. Voided 450ml. Pericare discussed and demonstrated. Pt verbalized understanding. Peripad, ice pack, tucks pads and underwear applied. Clean gown and warm blankets on. Pt to w/c for tx to PP.

## 2017-11-10 PROCEDURE — 85025 COMPLETE CBC W/AUTO DIFF WBC: CPT | Performed by: OBSTETRICS & GYNECOLOGY

## 2017-11-10 NOTE — LACTATION NOTE
This note was copied from a baby's chart.   LACTATION NOTE - INFANT    Evaluation Type  Evaluation Type: Inpatient    Problems & Assessment  Problems Diagnosed or Identified: Sleepy  Problems: comment/detail: Sleepy post circumcision  Infant Assessment: Ski

## 2017-11-10 NOTE — PROGRESS NOTES
[place] [facility] - [facility]    OB/Gyne Post  Progress Note      Bladen Pay Patient Status:  Inpatient    1985 MRN [id number]   Location [facility] 3SE Attending Kvng Maxwell MD   Hosp Day # 1 PCP MD Linwood Chapman of coronary artery disease     History of iritis     Paroxysmal digital cyanosis     Right lower quadrant abdominal tenderness, rebound tenderness presence not specified     Thrombosed external hemorrhoid     Vaginal delivery  .     ambulate, continue routi

## 2017-11-10 NOTE — LACTATION NOTE
LACTATION NOTE - MOTHER      Evaluation Type: Inpatient    Problems identified  Problems identified: Previous breast surgery  Problems Identified Other: Benign breast tumors removed both breasts    Maternal history  Other/comment: ovarian cyst, eosinophili

## 2017-11-10 NOTE — PLAN OF CARE
PAIN - ADULT    • Verbalizes/displays adequate comfort level or patient's stated pain goal Progressing        Patient Centered Care    • Patient preferences are identified and integrated in the patient's plan of care Progressing        POSTPARTUM    • Long

## 2017-11-11 VITALS
WEIGHT: 138 LBS | SYSTOLIC BLOOD PRESSURE: 101 MMHG | HEART RATE: 69 BPM | HEIGHT: 64 IN | OXYGEN SATURATION: 98 % | RESPIRATION RATE: 16 BRPM | TEMPERATURE: 97 F | DIASTOLIC BLOOD PRESSURE: 69 MMHG | BODY MASS INDEX: 23.56 KG/M2

## 2017-11-11 RX ORDER — HYDROCODONE BITARTRATE AND ACETAMINOPHEN 5; 325 MG/1; MG/1
1 TABLET ORAL EVERY 6 HOURS PRN
Qty: 20 TABLET | Refills: 0 | Status: SHIPPED | OUTPATIENT
Start: 2017-11-11 | End: 2017-12-19

## 2017-11-11 RX ORDER — IBUPROFEN 600 MG/1
600 TABLET ORAL EVERY 4 HOURS PRN
Qty: 60 TABLET | Refills: 0 | Status: SHIPPED | COMMUNITY
Start: 2017-11-11 | End: 2017-12-19

## 2017-11-11 NOTE — LACTATION NOTE
LACTATION NOTE - MOTHER      Evaluation Type: Inpatient    Problems identified  Problems identified: Previous breast surgery  Previous breast surgery: Lumpectomy  Problems Identified Other: Benign breast tumors removed both breasts    Maternal history  Oth

## 2017-11-11 NOTE — PROGRESS NOTES
[facility]    OB/Gyne Post  Progress Note      Alba Grow Patient Status:  Inpatient    1985 MRN [id number]   Location [facility] 3SE Attending Chloe Young MD   Hosp Day # 2 PCP 2400 MD Lindsey Prater

## 2017-11-11 NOTE — PLAN OF CARE
Problem: POSTPARTUM  Goal: Optimize infant feeding at the breast  INTERVENTIONS:  - Initiate breast feeding within first hour after birth. - Monitor effectiveness of current breast feeding efforts. - Assess support systems available to mother/family.   - cultural beliefs/practices regarding lactation, letdown techniques, maternal food preferences.   - Assess mother's knowledge and previous experience with breast feeding.  - Provide information as needed about early infant feeding cues (e.g., rooting, lip sm assist.  Teaching reinforced.

## 2017-11-11 NOTE — DISCHARGE SUMMARY
[place] [facility] - [facility]    Obstetrical Discharge Summary    Lowanda Boeck Sanfilippo Patient Status:  Inpatient    1985 MRN [id number]   Location [facility] 3SE Attending Nomi Stevens MD   Hosp Day # 2 PCP Georgina Norman MD     Date of Admi (62.6 kg)   LMP 02/04/2017   SpO2 98%   Breastfeeding?  Yes   BMI 23.69 kg/m²   General appearance:  alert, appears stated age, cooperative and no distress  Abdominal: soft, non-tender, no rebound  Uterus: firm, nontender, below umbilicus  Pelvic: deferred List    New Orders    ibuprofen 600 MG Oral Tab  Take 1 tablet (600 mg total) by mouth every 4 (four) hours as needed for Fever. HYDROcodone-acetaminophen 5-325 MG Oral Tab  Take 1 tablet by mouth every 6 (six) hours as needed for Pain.       Home Meds -

## 2017-11-11 NOTE — PLAN OF CARE
INADEQUATE LATCH, SUCK OR SWALLOW    • Demonstrate ability to latch and sustain latch, audible swallowing and satiety  Staff assit pt with brest feeding technique Progressing        PAIN - ADULT    • Verbalizes/displays adequate comfort level or patient's

## 2017-12-19 ENCOUNTER — OFFICE VISIT (OUTPATIENT)
Dept: INTERNAL MEDICINE CLINIC | Facility: CLINIC | Age: 32
End: 2017-12-19

## 2017-12-19 VITALS
OXYGEN SATURATION: 98 % | BODY MASS INDEX: 20.32 KG/M2 | HEART RATE: 70 BPM | HEIGHT: 64 IN | RESPIRATION RATE: 18 BRPM | SYSTOLIC BLOOD PRESSURE: 110 MMHG | DIASTOLIC BLOOD PRESSURE: 60 MMHG | TEMPERATURE: 98 F | WEIGHT: 119 LBS

## 2017-12-19 DIAGNOSIS — K64.5 THROMBOSED EXTERNAL HEMORRHOID: ICD-10-CM

## 2017-12-19 DIAGNOSIS — E87.6 HYPOKALEMIA: ICD-10-CM

## 2017-12-19 DIAGNOSIS — D50.9 MICROCYTIC ANEMIA: Primary | ICD-10-CM

## 2017-12-19 DIAGNOSIS — R00.1 SINUS BRADYCARDIA: ICD-10-CM

## 2017-12-19 DIAGNOSIS — Z86.69 HISTORY OF IRITIS: ICD-10-CM

## 2017-12-19 PROCEDURE — 80053 COMPREHEN METABOLIC PANEL: CPT | Performed by: INTERNAL MEDICINE

## 2017-12-19 PROCEDURE — 36415 COLL VENOUS BLD VENIPUNCTURE: CPT | Performed by: INTERNAL MEDICINE

## 2017-12-19 PROCEDURE — 93000 ELECTROCARDIOGRAM COMPLETE: CPT | Performed by: INTERNAL MEDICINE

## 2017-12-19 PROCEDURE — 84466 ASSAY OF TRANSFERRIN: CPT | Performed by: INTERNAL MEDICINE

## 2017-12-19 PROCEDURE — 99213 OFFICE O/P EST LOW 20 MIN: CPT | Performed by: INTERNAL MEDICINE

## 2017-12-19 PROCEDURE — 83540 ASSAY OF IRON: CPT | Performed by: INTERNAL MEDICINE

## 2017-12-19 PROCEDURE — 85025 COMPLETE CBC W/AUTO DIFF WBC: CPT | Performed by: INTERNAL MEDICINE

## 2017-12-19 PROCEDURE — 84443 ASSAY THYROID STIM HORMONE: CPT | Performed by: INTERNAL MEDICINE

## 2017-12-19 RX ORDER — POTASSIUM CHLORIDE 20 MEQ/1
TABLET, EXTENDED RELEASE ORAL
COMMUNITY
Start: 2017-12-04 | End: 2018-01-02

## 2017-12-19 NOTE — PROGRESS NOTES
Pt presented to clinic today for blood draw. Per physician able to draw orders. Orders  documented within chart. Pt tolerated lab draw well.  verified.   Orders drawn include: cbc, cmp, bind, tsh   Site of draw: rt glenda Lora, [place]IA

## 2017-12-19 NOTE — PROGRESS NOTES
HPI:    Patient ID: Loulou Richards is a 27year old female. HPI   Patient is 1 1/2 mo postpartum. Seen at [facility]  ED few days ago for palpitation. She felt dizzy while breastfeeding around 3 Am. Heart rate was in the low 40s. Bp was 120 systole.  Giorgio and oriented to person, place, and time. She has normal reflexes. No tremors. Skin: Skin is warm and dry.    Psychiatric: Judgment normal.       EKG : Normal sinus rythm       ASSESSMENT/PLAN:   Microcytic anemia  (primary encounter diagnosis)  Hypokal

## 2018-01-05 NOTE — PROGRESS NOTES
HPI:    Patient ID: Ahmet Kim is a 27year old female. HPI   Patient had been feeling anxious and overwhelmed since post partum and after holiday. She felt stressed with 3 kids at home and holiday celebration.  Her baby recently diagnosed to hav Eyes: Conjunctivae and EOM are normal. Pupils are equal, round, and reactive to light. No scleral icterus. Neck: Normal range of motion. Neck supple. No JVD present.    Cardiovascular: Normal rate, regular rhythm, normal heart sounds and intact distal p

## 2018-01-12 ENCOUNTER — TELEPHONE (OUTPATIENT)
Dept: INTERNAL MEDICINE CLINIC | Facility: CLINIC | Age: 33
End: 2018-01-12

## 2018-01-12 RX ORDER — ESZOPICLONE 2 MG/1
2 TABLET, FILM COATED ORAL NIGHTLY
Qty: 30 TABLET | Refills: 0 | Status: SHIPPED | OUTPATIENT
Start: 2018-01-12 | End: 2018-01-26

## 2018-01-12 NOTE — TELEPHONE ENCOUNTER
Vinny Powell stated that she had been back to work. Had difficulty sleeping at night despite Ativan 0.5 mgs. Minimal improvement since last visit. P : Eszopidone 2 mgs  1 tab at HS prn. Do not take Ativan while on eszopidone.   Will consult [facility] for post

## 2018-01-12 NOTE — TELEPHONE ENCOUNTER
Patient left message on the nurse line requesting a call from Dr Christian Lauren or Gisel Mueller.  She has been using the Ativan very sparingly but she is developing insomnia issues and has not been able to sleep for several days she is requesting additional medication for

## 2018-01-19 ENCOUNTER — TELEPHONE (OUTPATIENT)
Dept: INTERNAL MEDICINE CLINIC | Facility: CLINIC | Age: 33
End: 2018-01-19

## 2018-01-19 RX ORDER — SERTRALINE HYDROCHLORIDE 25 MG/1
25 TABLET, FILM COATED ORAL DAILY
Qty: 60 TABLET | Refills: 1 | Status: SHIPPED | OUTPATIENT
Start: 2018-01-19 | End: 2018-02-05

## 2018-01-19 NOTE — TELEPHONE ENCOUNTER
Patient called and left vm on nurse's line stating that she has questions regarding medication she is taking.

## 2018-04-24 ENCOUNTER — OFFICE VISIT (OUTPATIENT)
Dept: OBGYN CLINIC | Facility: CLINIC | Age: 33
End: 2018-04-24

## 2018-04-24 VITALS
HEIGHT: 64 IN | DIASTOLIC BLOOD PRESSURE: 56 MMHG | BODY MASS INDEX: 20.92 KG/M2 | SYSTOLIC BLOOD PRESSURE: 100 MMHG | WEIGHT: 122.5 LBS

## 2018-04-24 DIAGNOSIS — Z87.42 HISTORY OF OVARIAN CYST: ICD-10-CM

## 2018-04-24 DIAGNOSIS — F41.8 POSTPARTUM ANXIETY: ICD-10-CM

## 2018-04-24 DIAGNOSIS — Z01.419 WOMEN'S ANNUAL ROUTINE GYNECOLOGICAL EXAMINATION: Primary | ICD-10-CM

## 2018-04-24 PROCEDURE — 99385 PREV VISIT NEW AGE 18-39: CPT | Performed by: OBSTETRICS & GYNECOLOGY

## 2018-04-24 RX ORDER — NORETHINDRONE ACETATE AND ETHINYL ESTRADIOL 1MG-20(21)
1 KIT ORAL DAILY
Qty: 90 TABLET | Refills: 3 | Status: SHIPPED | OUTPATIENT
Start: 2018-04-24 | End: 2019-02-26

## 2018-04-24 NOTE — PROGRESS NOTES
NEW GYN H&P     2018  2:28 PM    CC: Patient is here to establish care    HPI: Patient is a 27year old   here for annual exam and to discuss birth control.  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

## 2018-07-10 PROCEDURE — 85025 COMPLETE CBC W/AUTO DIFF WBC: CPT | Performed by: INTERNAL MEDICINE

## 2018-07-10 PROCEDURE — 82306 VITAMIN D 25 HYDROXY: CPT | Performed by: INTERNAL MEDICINE

## 2018-07-10 PROCEDURE — 84439 ASSAY OF FREE THYROXINE: CPT | Performed by: INTERNAL MEDICINE

## 2018-07-10 PROCEDURE — 80053 COMPREHEN METABOLIC PANEL: CPT | Performed by: INTERNAL MEDICINE

## 2018-07-10 PROCEDURE — 84443 ASSAY THYROID STIM HORMONE: CPT | Performed by: INTERNAL MEDICINE

## 2018-07-10 NOTE — PROGRESS NOTES
HPI:    Patient ID: Cameron Ramos is a 27year old female. HPI     Here for f/u post partum anxiety. Overall felt better. Sees HERMELINDA Brito from [facility]. Now on zoloft 75 mgs QD, Clonazepam  0.5 mgs  BID prn.   She is working partime as Diabet Head: Normocephalic. Mouth/Throat: Oropharynx is clear and moist.   Eyes: Conjunctivae and EOM are normal. Pupils are equal, round, and reactive to light. Neck: Normal range of motion. Neck supple. No JVD present.    Cardiovascular: Normal rate, regul

## 2018-08-15 ENCOUNTER — OFFICE VISIT (OUTPATIENT)
Dept: FAMILY MEDICINE CLINIC | Facility: CLINIC | Age: 33
End: 2018-08-15
Payer: COMMERCIAL

## 2018-08-15 VITALS
BODY MASS INDEX: 20.49 KG/M2 | RESPIRATION RATE: 16 BRPM | TEMPERATURE: 99 F | OXYGEN SATURATION: 99 % | HEIGHT: 64 IN | HEART RATE: 77 BPM | WEIGHT: 120 LBS | DIASTOLIC BLOOD PRESSURE: 70 MMHG | SYSTOLIC BLOOD PRESSURE: 115 MMHG

## 2018-08-15 DIAGNOSIS — J01.10 ACUTE FRONTAL SINUSITIS, RECURRENCE NOT SPECIFIED: Primary | ICD-10-CM

## 2018-08-15 PROCEDURE — 99213 OFFICE O/P EST LOW 20 MIN: CPT | Performed by: NURSE PRACTITIONER

## 2018-08-15 RX ORDER — DOXYCYCLINE HYCLATE 100 MG/1
100 CAPSULE ORAL 2 TIMES DAILY
Qty: 14 CAPSULE | Refills: 0 | Status: SHIPPED | OUTPATIENT
Start: 2018-08-15 | End: 2018-08-22

## 2018-08-15 NOTE — PROGRESS NOTES
CHIEF COMPLAINT:   Patient presents with:  Sinus Problem      HPI:   Monica Wetzel is a 26year old female who presents for right-sided sinus congestion for 1 week. Symptoms have been worsening since onset. Symptoms fluctuate throughout day and night. Comment: wisdom teeth  No date: OTHER SURGICAL HISTORY      Comment: wisdom teeth  2008: UPPER GI ENDOSCOPY,DIAGNOSIS      Comment: elsewhere, negative  5/29/15: UPPER GI ENDOSCOPY,DIAGNOSIS      Comment: esophageal ridges with distal esophageal NEURO: No focal deficits       ASSESSMENT AND PLAN:   ASSESSMENT:  Sulma Bass is a 26year old female who presents with    ASSESSMENT: Acute frontal sinusitis, recurrence not specified  (primary encounter diagnosis)    PLAN: Meds and instructions a · You can use an over-the-counter decongestant, unless a similar medicine was prescribed to you. Nasal sprays work the fastest. Use one that contains phenylephrine or oxymetazoline. First blow your nose gently. Then use the spray.  Do not use these medicine · Don’t have close contact with people who have sore throats, colds, or other upper respiratory infections. · Don’t smoke, and stay away from secondhand smoke. · Stay up to date with of your vaccines.   Date Last Reviewed: 11/1/2017  © [id number] [employer]

## 2018-09-20 ENCOUNTER — APPOINTMENT (OUTPATIENT)
Dept: LAB | Facility: HOSPITAL | Age: 33
End: 2018-09-20
Attending: INTERNAL MEDICINE
Payer: COMMERCIAL

## 2018-09-20 DIAGNOSIS — E87.1 HYPONATREMIA: Primary | ICD-10-CM

## 2018-09-20 DIAGNOSIS — E87.1 HYPONATREMIA: ICD-10-CM

## 2018-09-20 LAB
ANION GAP SERPL CALC-SCNC: 8 MMOL/L (ref 0–18)
BUN SERPL-MCNC: 7 MG/DL (ref 8–20)
BUN/CREAT SERPL: 10.3 (ref 10–20)
CALCIUM SERPL-MCNC: 8.3 MG/DL (ref 8.5–10.5)
CHLORIDE SERPL-SCNC: 104 MMOL/L (ref 95–110)
CO2 SERPL-SCNC: 25 MMOL/L (ref 22–32)
CREAT SERPL-MCNC: 0.68 MG/DL (ref 0.5–1.5)
GLUCOSE SERPL-MCNC: 102 MG/DL (ref 70–99)
OSMOLALITY UR CALC.SUM OF ELEC: 282 MOSM/KG (ref 275–295)
POTASSIUM SERPL-SCNC: 3.3 MMOL/L (ref 3.3–5.1)
SODIUM SERPL-SCNC: 137 MMOL/L (ref 136–144)

## 2018-09-20 PROCEDURE — 36415 COLL VENOUS BLD VENIPUNCTURE: CPT

## 2018-09-20 PROCEDURE — 80048 BASIC METABOLIC PNL TOTAL CA: CPT

## 2018-10-23 NOTE — PROGRESS NOTES
Quincy Valderrama is a 26year old female. Patient presents with:   Integrative Medicine Consult: michaela       HPI:     Anxiety since 6 weeks after the birth of her last child  Difficulty eating, general nervous feeling  Insomnia, weight loss, no appetit Sertraline HCl 100 MG Oral Tab Take 1 tablet (100 mg total) by mouth daily. Disp: 90 tablet Rfl: 1   omeprazole 20 MG Oral Capsule Delayed Release Take 1 capsule (20 mg total) by mouth every morning.  Disp: 90 capsule Rfl: 3   Norethin Ace-Eth Estrad-FE (Jake Katz • Benign biopsy right  age 23    2 x's   • Other surgical history      benign right breast mass excision, two benign left breast tumors removed   • Other surgical history      wisdom teeth   • Other surgical history      wisdom teeth   • Upper gi endoscopy Evaluate for micronutrient deficiency and marker for dysbiosis. Ferritin level ordered. Low ferritin can impact energy levels and overall body function. Referred for acupuncture to further manage symptoms.   Advised to try Reiki for anxiety  Counseled o Nanoboost + (hemp oil with terpenes)      Directions: 3 drops under the tongue three times per day  Where:  [email or web address]  Note: it may take up to 2 months to see full benefit for your system.      Hemp oil - Calm   For anxiety

## 2018-10-23 NOTE — PATIENT INSTRUCTIONS
The products and items listed below (the “Products”)  and their claims have not been evaluated by the Food and Drug Administration. Dietary products are not intended to treat, prevent, mitigate or cure disease.  Ultimately, you must draw your own conclusion · Set aside 5 min throughout your day to sit in silence, meditation or deep breathing; If you can fit in taking a shower and brushing your teeth, then you can fit in this quiet time for yourself.   · Start by sitting in a comfortable position, keep feet on From Aure Ward: “Breathing in, I calm my body. Breathing out, I smile. Dwelling in the present moment, I know this is a wonderful moment.”    Keep a gratitude journal of 5 things that you are grateful for every day.      Miki Mcginnis Meditation -

## 2019-02-26 ENCOUNTER — OFFICE VISIT (OUTPATIENT)
Dept: INTERNAL MEDICINE CLINIC | Facility: CLINIC | Age: 34
End: 2019-02-26
Payer: COMMERCIAL

## 2019-02-26 VITALS — HEART RATE: 76 BPM | HEIGHT: 64 IN | RESPIRATION RATE: 22 BRPM | BODY MASS INDEX: 20 KG/M2 | OXYGEN SATURATION: 100 %

## 2019-02-26 DIAGNOSIS — R61 NIGHT SWEATS: ICD-10-CM

## 2019-02-26 DIAGNOSIS — K20.0 EOSINOPHILIC ESOPHAGITIS: ICD-10-CM

## 2019-02-26 DIAGNOSIS — R73.9 HYPERGLYCEMIA: ICD-10-CM

## 2019-02-26 DIAGNOSIS — Z00.00 GENERAL MEDICAL EXAM: Primary | ICD-10-CM

## 2019-02-26 PROBLEM — R10.813 RIGHT LOWER QUADRANT ABDOMINAL TENDERNESS, REBOUND TENDERNESS PRESENCE NOT SPECIFIED: Status: RESOLVED | Noted: 2017-05-10 | Resolved: 2019-02-26

## 2019-02-26 PROBLEM — K64.5 THROMBOSED EXTERNAL HEMORRHOID: Status: RESOLVED | Noted: 2017-10-19 | Resolved: 2019-02-26

## 2019-02-26 PROCEDURE — 99395 PREV VISIT EST AGE 18-39: CPT | Performed by: INTERNAL MEDICINE

## 2019-02-26 RX ORDER — ERGOCALCIFEROL 1.25 MG/1
50000 CAPSULE ORAL WEEKLY
Qty: 4 CAPSULE | Refills: 0 | Status: SHIPPED | OUTPATIENT
Start: 2019-02-26 | End: 2019-03-28

## 2019-02-26 NOTE — PROGRESS NOTES
Alba Cohn is a 26year old female who presents for a complete physical exam. Symptoms: denies discharge, itching, burning or dysuria, periods are regular. Patient sees Dr. Ashish Ulloa. C/o night sweats for > 4 mos.  Denies fever, weight loss breast tumors removed   • OTHER SURGICAL HISTORY      wisdom teeth   • OTHER SURGICAL HISTORY      wisdom teeth   • UPPER GI ENDOSCOPY,DIAGNOSIS  2008    elsewhere, negative   • UPPER GI ENDOSCOPY,DIAGNOSIS  5/29/15    esophageal ridges with distal esophag supple,no adenopathy,no bruits  CHEST: no chest tenderness  BREAST: no dominant or suspicious mass  LUNGS: clear to auscultation  CARDIO: RRR without murmur  GI: good BS's,no masses, HSM or tenderness  MUSCULOSKELETAL: back is not tender,FROM of the back

## 2019-03-05 ENCOUNTER — APPOINTMENT (OUTPATIENT)
Dept: LAB | Facility: HOSPITAL | Age: 34
End: 2019-03-05
Attending: FAMILY MEDICINE
Payer: COMMERCIAL

## 2019-03-05 DIAGNOSIS — R61 NIGHT SWEATS: ICD-10-CM

## 2019-03-05 DIAGNOSIS — G47.00 INSOMNIA, UNSPECIFIED TYPE: ICD-10-CM

## 2019-03-05 DIAGNOSIS — K20.0 EOSINOPHILIC ESOPHAGITIS: ICD-10-CM

## 2019-03-05 DIAGNOSIS — R73.9 HYPERGLYCEMIA: ICD-10-CM

## 2019-03-05 DIAGNOSIS — F41.9 ANXIETY: ICD-10-CM

## 2019-03-05 DIAGNOSIS — Z00.00 GENERAL MEDICAL EXAM: ICD-10-CM

## 2019-03-05 LAB
ALBUMIN SERPL-MCNC: 4 G/DL (ref 3.4–5)
ALBUMIN/GLOB SERPL: 1.4 {RATIO} (ref 1–2)
ALP LIVER SERPL-CCNC: 41 U/L (ref 37–98)
ALT SERPL-CCNC: 21 U/L (ref 13–56)
ANION GAP SERPL CALC-SCNC: 6 MMOL/L (ref 0–18)
AST SERPL-CCNC: 24 U/L (ref 15–37)
BASOPHILS # BLD AUTO: 0.03 X10(3) UL (ref 0–0.2)
BASOPHILS NFR BLD AUTO: 0.7 %
BILIRUB SERPL-MCNC: 0.6 MG/DL (ref 0.1–2)
BUN BLD-MCNC: 12 MG/DL (ref 7–18)
BUN/CREAT SERPL: 16.4 (ref 10–20)
CALCIUM BLD-MCNC: 8.8 MG/DL (ref 8.5–10.1)
CHLORIDE SERPL-SCNC: 108 MMOL/L (ref 98–107)
CHOLEST SMN-MCNC: 137 MG/DL (ref ?–200)
CO2 SERPL-SCNC: 27 MMOL/L (ref 21–32)
CREAT BLD-MCNC: 0.73 MG/DL (ref 0.55–1.02)
DEPRECATED HBV CORE AB SER IA-ACNC: 15.6 NG/ML (ref 12–160)
DEPRECATED RDW RBC AUTO: 40.4 FL (ref 35.1–46.3)
DHEA-S SERPL-MCNC: 72.3 UG/DL
EOSINOPHIL # BLD AUTO: 0.05 X10(3) UL (ref 0–0.7)
EOSINOPHIL NFR BLD AUTO: 1.1 %
ERYTHROCYTE [DISTWIDTH] IN BLOOD BY AUTOMATED COUNT: 14 % (ref 11–15)
EST. AVERAGE GLUCOSE BLD GHB EST-MCNC: 108 MG/DL (ref 68–126)
ESTRADIOL SERPL-MCNC: 50 PG/ML
FOLATE SERPL-MCNC: >20 NG/ML (ref 8.7–?)
FSH SERPL-ACNC: 7.5 MIU/ML
GLOBULIN PLAS-MCNC: 2.8 G/DL (ref 2.8–4.4)
GLUCOSE BLD-MCNC: 86 MG/DL (ref 70–99)
HAV IGM SER QL: 2.3 MG/DL (ref 1.6–2.6)
HBA1C MFR BLD HPLC: 5.4 % (ref ?–5.7)
HCT VFR BLD AUTO: 36.2 % (ref 35–48)
HDLC SERPL-MCNC: 57 MG/DL (ref 40–59)
HGB BLD-MCNC: 11.4 G/DL (ref 12–16)
IMM GRANULOCYTES # BLD AUTO: 0.02 X10(3) UL (ref 0–1)
IMM GRANULOCYTES NFR BLD: 0.4 %
LDH SERPL L TO P-CCNC: 167 U/L (ref 84–246)
LDLC SERPL CALC-MCNC: 71 MG/DL (ref ?–100)
LYMPHOCYTES # BLD AUTO: 1.35 X10(3) UL (ref 1–4)
LYMPHOCYTES NFR BLD AUTO: 29.8 %
M PROTEIN MFR SERPL ELPH: 6.8 G/DL (ref 6.4–8.2)
MCH RBC QN AUTO: 25.4 PG (ref 26–34)
MCHC RBC AUTO-ENTMCNC: 31.5 G/DL (ref 31–37)
MCV RBC AUTO: 80.8 FL (ref 80–100)
MONOCYTES # BLD AUTO: 0.3 X10(3) UL (ref 0.1–1)
MONOCYTES NFR BLD AUTO: 6.6 %
NEUTROPHILS # BLD AUTO: 2.78 X10 (3) UL (ref 1.5–7.7)
NEUTROPHILS # BLD AUTO: 2.78 X10(3) UL (ref 1.5–7.7)
NEUTROPHILS NFR BLD AUTO: 61.4 %
NONHDLC SERPL-MCNC: 80 MG/DL (ref ?–130)
OSMOLALITY SERPL CALC.SUM OF ELEC: 291 MOSM/KG (ref 275–295)
PLATELET # BLD AUTO: 261 10(3)UL (ref 150–450)
POTASSIUM SERPL-SCNC: 3.8 MMOL/L (ref 3.5–5.1)
RBC # BLD AUTO: 4.48 X10(6)UL (ref 3.8–5.3)
SODIUM SERPL-SCNC: 141 MMOL/L (ref 136–145)
TRIGL SERPL-MCNC: 45 MG/DL (ref 30–149)
TSI SER-ACNC: 1.81 MIU/ML (ref 0.36–3.74)
VIT B12 SERPL-MCNC: 672 PG/ML (ref 193–986)
VLDLC SERPL CALC-MCNC: 9 MG/DL (ref 0–30)
WBC # BLD AUTO: 4.5 X10(3) UL (ref 4–11)

## 2019-03-05 PROCEDURE — 80061 LIPID PANEL: CPT

## 2019-03-05 PROCEDURE — 83615 LACTATE (LD) (LDH) ENZYME: CPT

## 2019-03-05 PROCEDURE — 82728 ASSAY OF FERRITIN: CPT

## 2019-03-05 PROCEDURE — 82627 DEHYDROEPIANDROSTERONE: CPT

## 2019-03-05 PROCEDURE — 84140 ASSAY OF PREGNENOLONE: CPT

## 2019-03-05 PROCEDURE — 83735 ASSAY OF MAGNESIUM: CPT

## 2019-03-05 PROCEDURE — 82306 VITAMIN D 25 HYDROXY: CPT

## 2019-03-05 PROCEDURE — 84443 ASSAY THYROID STIM HORMONE: CPT

## 2019-03-05 PROCEDURE — 80053 COMPREHEN METABOLIC PANEL: CPT

## 2019-03-05 PROCEDURE — 86628 CANDIDA ANTIBODY: CPT

## 2019-03-05 PROCEDURE — 85025 COMPLETE CBC W/AUTO DIFF WBC: CPT

## 2019-03-05 PROCEDURE — 36415 COLL VENOUS BLD VENIPUNCTURE: CPT

## 2019-03-05 PROCEDURE — 83001 ASSAY OF GONADOTROPIN (FSH): CPT

## 2019-03-05 PROCEDURE — 82746 ASSAY OF FOLIC ACID SERUM: CPT

## 2019-03-05 PROCEDURE — 82607 VITAMIN B-12: CPT

## 2019-03-05 PROCEDURE — 82670 ASSAY OF TOTAL ESTRADIOL: CPT

## 2019-03-05 PROCEDURE — 84207 ASSAY OF VITAMIN B-6: CPT

## 2019-03-05 PROCEDURE — 83036 HEMOGLOBIN GLYCOSYLATED A1C: CPT

## 2019-03-06 LAB
25(OH)D3 SERPL-MCNC: 34.7 NG/ML (ref 30–100)
CANDIDA ANTIBODY IGA: 0.13 EV
CANDIDA ANTIBODY IGG: 0.21 EV
CANDIDA ANTIBODY IGM: 0.58 EV

## 2019-03-07 LAB — PREGNENOLONE BY MS/MS, SERUM: 43 NG/DL

## 2019-03-08 LAB — VITAMIN B6: 78.7 NMOL/L

## 2019-03-30 RX ORDER — ERGOCALCIFEROL 1.25 MG/1
CAPSULE ORAL
Qty: 6 CAPSULE | Refills: 0 | Status: SHIPPED | OUTPATIENT
Start: 2019-03-30 | End: 2019-07-03

## 2019-05-23 ENCOUNTER — OFFICE VISIT (OUTPATIENT)
Dept: OBGYN CLINIC | Facility: CLINIC | Age: 34
End: 2019-05-23
Payer: COMMERCIAL

## 2019-05-23 VITALS
SYSTOLIC BLOOD PRESSURE: 98 MMHG | DIASTOLIC BLOOD PRESSURE: 60 MMHG | HEIGHT: 64 IN | WEIGHT: 122.19 LBS | BODY MASS INDEX: 20.86 KG/M2

## 2019-05-23 DIAGNOSIS — Z01.419 WOMEN'S ANNUAL ROUTINE GYNECOLOGICAL EXAMINATION: Primary | ICD-10-CM

## 2019-05-23 PROCEDURE — 99395 PREV VISIT EST AGE 18-39: CPT | Performed by: OBSTETRICS & GYNECOLOGY

## 2019-05-23 NOTE — PROGRESS NOTES
GYN ANNUAL    2019  10:50 AM    CC:Patient presents for exam    HPI: Patient is a 26year old  LMP 2019 here for annual gyn exam. Cycles became irregular when on LoEstrin so stopped pills and cycles have resumed being regular.  Has been not • UPPER GI ENDOSCOPY,DIAGNOSIS  5/29/15    esophageal ridges with distal esophageal rings, esophageal bx showed >50 eos/hpf, SB Bx negaitve, esophageal dilation 18 to 19 mm       Clindamycin             PAIN    Comment:Gi upset  Penicillins             H distress  SKIN: no rashes, no lesions  HEENT: normal  LUNGS: respiration unlabored  CARDIOVASCULAR: no peripheral edema or varicosities, skin warm and dry  BREASTS: bilaterally nontender, no palpable masses, no nipple discharge, no skin changes, no axillar

## 2019-07-03 RX ORDER — ERGOCALCIFEROL 1.25 MG/1
CAPSULE ORAL
Qty: 6 CAPSULE | Refills: 0 | Status: SHIPPED | OUTPATIENT
Start: 2019-07-03 | End: 2019-09-06

## 2020-03-03 ENCOUNTER — OFFICE VISIT (OUTPATIENT)
Dept: INTERNAL MEDICINE CLINIC | Facility: CLINIC | Age: 35
End: 2020-03-03
Payer: COMMERCIAL

## 2020-03-03 VITALS
SYSTOLIC BLOOD PRESSURE: 122 MMHG | HEART RATE: 62 BPM | WEIGHT: 120 LBS | RESPIRATION RATE: 22 BRPM | BODY MASS INDEX: 20.49 KG/M2 | DIASTOLIC BLOOD PRESSURE: 60 MMHG | HEIGHT: 64 IN | OXYGEN SATURATION: 99 %

## 2020-03-03 DIAGNOSIS — H93.12 TINNITUS OF LEFT EAR: ICD-10-CM

## 2020-03-03 DIAGNOSIS — Z00.00 ROUTINE ADULT HEALTH MAINTENANCE: Primary | ICD-10-CM

## 2020-03-03 DIAGNOSIS — K20.0 EOSINOPHILIC ESOPHAGITIS: ICD-10-CM

## 2020-03-03 DIAGNOSIS — Z01.419 ROUTINE GYNECOLOGICAL EXAMINATION: ICD-10-CM

## 2020-03-03 DIAGNOSIS — D24.2 FIBROADENOMA OF BREAST, LEFT: ICD-10-CM

## 2020-03-03 DIAGNOSIS — F41.9 ANXIETY: ICD-10-CM

## 2020-03-03 LAB
ALBUMIN SERPL-MCNC: 3.9 G/DL (ref 3.4–5)
ALBUMIN/GLOB SERPL: 1.4 {RATIO} (ref 1–2)
ALP LIVER SERPL-CCNC: 42 U/L (ref 37–98)
ALT SERPL-CCNC: 20 U/L (ref 13–56)
ANION GAP SERPL CALC-SCNC: 6 MMOL/L (ref 0–18)
AST SERPL-CCNC: 20 U/L (ref 15–37)
BASOPHILS # BLD AUTO: 0.04 X10(3) UL (ref 0–0.2)
BASOPHILS NFR BLD AUTO: 0.8 %
BILIRUB SERPL-MCNC: 0.5 MG/DL (ref 0.1–2)
BUN BLD-MCNC: 11 MG/DL (ref 7–18)
BUN/CREAT SERPL: 15.1 (ref 10–20)
CALCIUM BLD-MCNC: 8.6 MG/DL (ref 8.5–10.1)
CHLORIDE SERPL-SCNC: 108 MMOL/L (ref 98–112)
CHOLEST SMN-MCNC: 142 MG/DL (ref ?–200)
CO2 SERPL-SCNC: 25 MMOL/L (ref 21–32)
CREAT BLD-MCNC: 0.73 MG/DL (ref 0.55–1.02)
DEPRECATED HBV CORE AB SER IA-ACNC: 7.3 NG/ML (ref 12–160)
DEPRECATED RDW RBC AUTO: 43.6 FL (ref 35.1–46.3)
EOSINOPHIL # BLD AUTO: 0.04 X10(3) UL (ref 0–0.7)
EOSINOPHIL NFR BLD AUTO: 0.8 %
ERYTHROCYTE [DISTWIDTH] IN BLOOD BY AUTOMATED COUNT: 14.6 % (ref 11–15)
FOLATE SERPL-MCNC: >20 NG/ML (ref 8.7–?)
GLOBULIN PLAS-MCNC: 2.7 G/DL (ref 2.8–4.4)
GLUCOSE BLD-MCNC: 80 MG/DL (ref 70–99)
HCT VFR BLD AUTO: 36 % (ref 35–48)
HDLC SERPL-MCNC: 77 MG/DL (ref 40–59)
HGB BLD-MCNC: 11.1 G/DL (ref 12–16)
IMM GRANULOCYTES # BLD AUTO: 0.01 X10(3) UL (ref 0–1)
IMM GRANULOCYTES NFR BLD: 0.2 %
IRON SATURATION: 20 % (ref 15–50)
IRON SERPL-MCNC: 101 UG/DL (ref 50–170)
LDLC SERPL CALC-MCNC: 58 MG/DL (ref ?–100)
LYMPHOCYTES # BLD AUTO: 1.13 X10(3) UL (ref 1–4)
LYMPHOCYTES NFR BLD AUTO: 22.4 %
M PROTEIN MFR SERPL ELPH: 6.6 G/DL (ref 6.4–8.2)
MCH RBC QN AUTO: 25.2 PG (ref 26–34)
MCHC RBC AUTO-ENTMCNC: 30.8 G/DL (ref 31–37)
MCV RBC AUTO: 81.8 FL (ref 80–100)
MONOCYTES # BLD AUTO: 0.3 X10(3) UL (ref 0.1–1)
MONOCYTES NFR BLD AUTO: 5.9 %
NEUTROPHILS # BLD AUTO: 3.53 X10 (3) UL (ref 1.5–7.7)
NEUTROPHILS # BLD AUTO: 3.53 X10(3) UL (ref 1.5–7.7)
NEUTROPHILS NFR BLD AUTO: 69.9 %
NONHDLC SERPL-MCNC: 65 MG/DL (ref ?–130)
OSMOLALITY SERPL CALC.SUM OF ELEC: 286 MOSM/KG (ref 275–295)
PATIENT FASTING Y/N/NP: YES
PATIENT FASTING Y/N/NP: YES
PLATELET # BLD AUTO: 238 10(3)UL (ref 150–450)
POTASSIUM SERPL-SCNC: 3.6 MMOL/L (ref 3.5–5.1)
RBC # BLD AUTO: 4.4 X10(6)UL (ref 3.8–5.3)
SODIUM SERPL-SCNC: 139 MMOL/L (ref 136–145)
TOTAL IRON BINDING CAPACITY: 495 UG/DL (ref 240–450)
TRANSFERRIN SERPL-MCNC: 332 MG/DL (ref 200–360)
TRIGL SERPL-MCNC: 35 MG/DL (ref 30–149)
TSI SER-ACNC: 1.68 MIU/ML (ref 0.36–3.74)
VIT B12 SERPL-MCNC: 578 PG/ML (ref 193–986)
VLDLC SERPL CALC-MCNC: 7 MG/DL (ref 0–30)
WBC # BLD AUTO: 5.1 X10(3) UL (ref 4–11)

## 2020-03-03 PROCEDURE — 82306 VITAMIN D 25 HYDROXY: CPT | Performed by: INTERNAL MEDICINE

## 2020-03-03 PROCEDURE — 84466 ASSAY OF TRANSFERRIN: CPT | Performed by: INTERNAL MEDICINE

## 2020-03-03 PROCEDURE — 99395 PREV VISIT EST AGE 18-39: CPT | Performed by: INTERNAL MEDICINE

## 2020-03-03 PROCEDURE — 82607 VITAMIN B-12: CPT | Performed by: INTERNAL MEDICINE

## 2020-03-03 PROCEDURE — 85025 COMPLETE CBC W/AUTO DIFF WBC: CPT | Performed by: INTERNAL MEDICINE

## 2020-03-03 PROCEDURE — 84207 ASSAY OF VITAMIN B-6: CPT | Performed by: INTERNAL MEDICINE

## 2020-03-03 PROCEDURE — 82728 ASSAY OF FERRITIN: CPT | Performed by: INTERNAL MEDICINE

## 2020-03-03 PROCEDURE — 80053 COMPREHEN METABOLIC PANEL: CPT | Performed by: INTERNAL MEDICINE

## 2020-03-03 PROCEDURE — 83540 ASSAY OF IRON: CPT | Performed by: INTERNAL MEDICINE

## 2020-03-03 PROCEDURE — 82746 ASSAY OF FOLIC ACID SERUM: CPT | Performed by: INTERNAL MEDICINE

## 2020-03-03 PROCEDURE — 36415 COLL VENOUS BLD VENIPUNCTURE: CPT | Performed by: INTERNAL MEDICINE

## 2020-03-03 PROCEDURE — 80061 LIPID PANEL: CPT | Performed by: INTERNAL MEDICINE

## 2020-03-03 PROCEDURE — 84443 ASSAY THYROID STIM HORMONE: CPT | Performed by: INTERNAL MEDICINE

## 2020-03-03 NOTE — PROGRESS NOTES
Patient presented in office today for fasting blood draw. Blood draw successful in left arm  Colby:  Cbc,cmp,lipid,tsh,vitamin d,vitamin b6,folate,iron,ferritin  D. O.B verified   Orders per Doctor Co

## 2020-03-03 NOTE — PROGRESS NOTES
Darlene Erickson is a 37year old female who presents for a complete physical.    Her period is regular. She will see Dr. Padma Baez for gyne exam.     Ho Eosinophilic esophagitis, iron deficiency anemia.      Eosinophilic esophagitis: Responding well benign right breast mass excision, two benign left breast tumors removed   • OTHER SURGICAL HISTORY      wisdom teeth   • OTHER SURGICAL HISTORY      wisdom teeth   • UPPER GI ENDOSCOPY,DIAGNOSIS  2008    elsewhere, negative   • UPPER GI ENDOSCOPY,DIAGNOSI apparent distress  SKIN: no rashes,no suspicious lesions  HEENT: atraumatic, normocephalic,ears and throat are clear  EYES:PERRLA, EOMI,conjunctiva are clear  NECK: supple,no adenopathy,no bruits  CHEST: no chest tenderness  BREAST: no dominant or suspicio

## 2020-03-04 LAB — 25(OH)D3 SERPL-MCNC: 29.9 NG/ML (ref 30–100)

## 2020-03-06 LAB — VITAMIN B6: 71.4 NMOL/L

## 2020-03-12 ENCOUNTER — TELEPHONE (OUTPATIENT)
Dept: INTERNAL MEDICINE CLINIC | Facility: CLINIC | Age: 35
End: 2020-03-12

## 2020-03-12 DIAGNOSIS — D24.2 BENIGN NEOPLASM OF LEFT BREAST: Primary | ICD-10-CM

## 2020-05-28 ENCOUNTER — TELEMEDICINE (OUTPATIENT)
Dept: NEUROLOGY | Facility: CLINIC | Age: 35
End: 2020-05-28

## 2020-05-28 DIAGNOSIS — M76.30 IT BAND SYNDROME, UNSPECIFIED LATERALITY: ICD-10-CM

## 2020-05-28 DIAGNOSIS — R20.0 NUMBNESS AND TINGLING OF BOTH FEET: ICD-10-CM

## 2020-05-28 DIAGNOSIS — R20.2 NUMBNESS AND TINGLING OF BOTH FEET: ICD-10-CM

## 2020-05-28 DIAGNOSIS — M54.59 MECHANICAL LOW BACK PAIN: Primary | ICD-10-CM

## 2020-05-28 PROCEDURE — 99204 OFFICE O/P NEW MOD 45 MIN: CPT | Performed by: PHYSICAL MEDICINE & REHABILITATION

## 2020-05-28 RX ORDER — DICLOFENAC SODIUM 75 MG/1
75 TABLET, DELAYED RELEASE ORAL 2 TIMES DAILY
Qty: 60 TABLET | Refills: 1 | Status: SHIPPED | OUTPATIENT
Start: 2020-05-28 | End: 2020-06-19

## 2020-05-28 NOTE — H&P
[address]  Video Visit History and Physical Note    Sunday Michael Stanley verbally consents to a Telemedicine Visit on 05/28/20.  This visit is conducted using Telemedicine with live, interactive audio and vide SURGICAL HISTORY      wisdom teeth   • OTHER SURGICAL HISTORY      wisdom teeth   • UPPER GI ENDOSCOPY,DIAGNOSIS  2008    elsewhere, negative   • UPPER GI ENDOSCOPY,DIAGNOSIS  5/29/15    esophageal ridges with distal esophageal rings, esophageal bx showed HPI  Skin: Negative. Neurological: As per HPI  Endo/Heme/Allergies: Negative. Psychiatric/Behavioral: Negative. All other systems reviewed and are negative. Pertinent positives and negatives noted in the HPI.         PHYSICAL EXAM:     There is n back – any areas of tenderness? • Range of motion facing the side (lateral view) – Touch your toes, bend back, now while bending back rotate left/right (facet loading), side bend left/right. Any of that hurt? If so where?   • Strength – Sit to stand (pleas 03/03/2020 0.01  0.00 - 1.00 x10(3) uL Final   • Neutrophil % 03/03/2020 69.9  % Final   • Lymphocyte % 03/03/2020 22.4  % Final   • Monocyte % 03/03/2020 5.9  % Final   • Eosinophil % 03/03/2020 0.8  % Final   • Basophil % 03/03/2020 0.8  % Final   • Herminia 03/03/2020 71.4  20.0 - 125.0 nmol/L Final   • Iron 03/03/2020 101  50 - 170 ug/dL Final   • Transferrin 03/03/2020 332  200 - 360 mg/dL Final   • Total Iron Binding Capacity 03/03/2020 495* 240 - 450 ug/dL Final   • % Saturation 03/03/2020 20  15 - 50 % F worsen COVID-19 infection symptoms. The patient was also informed that corticosteroids, in any form, may significantly decrease immune response and may increase risk and complications of infection.     The patient was advised that given the current situati

## 2020-05-28 NOTE — PATIENT INSTRUCTIONS
1) get Xr of the lumbar spine when you have a chance  2) Take Diclofenac 75 mg 1 tablet twice per day with food for the next two weeks and then as needed but no more than 2 tablets per day.  Do not take with any other NSAIDS (Ibuprofen, Advil, Aleve, Napros

## 2020-05-29 ENCOUNTER — TELEPHONE (OUTPATIENT)
Dept: PHYSICAL THERAPY | Facility: HOSPITAL | Age: 35
End: 2020-05-29

## 2020-06-02 ENCOUNTER — HOSPITAL ENCOUNTER (OUTPATIENT)
Dept: GENERAL RADIOLOGY | Facility: HOSPITAL | Age: 35
Discharge: HOME | End: 2020-06-02
Attending: PHYSICAL MEDICINE & REHABILITATION
Payer: COMMERCIAL

## 2020-06-02 ENCOUNTER — HOSPITAL ENCOUNTER (OUTPATIENT)
Dept: MAMMOGRAPHY | Facility: HOSPITAL | Age: 35
Discharge: HOME | End: 2020-06-02
Attending: INTERNAL MEDICINE
Payer: COMMERCIAL

## 2020-06-02 DIAGNOSIS — R20.0 NUMBNESS AND TINGLING OF BOTH FEET: ICD-10-CM

## 2020-06-02 DIAGNOSIS — M54.59 MECHANICAL LOW BACK PAIN: ICD-10-CM

## 2020-06-02 DIAGNOSIS — M76.30 IT BAND SYNDROME, UNSPECIFIED LATERALITY: ICD-10-CM

## 2020-06-02 DIAGNOSIS — D24.2 FIBROADENOMA OF BREAST, LEFT: ICD-10-CM

## 2020-06-02 DIAGNOSIS — R20.2 NUMBNESS AND TINGLING OF BOTH FEET: ICD-10-CM

## 2020-06-02 PROCEDURE — 72110 X-RAY EXAM L-2 SPINE 4/>VWS: CPT | Performed by: PHYSICAL MEDICINE & REHABILITATION

## 2020-06-10 ENCOUNTER — TELEPHONE (OUTPATIENT)
Dept: PHYSICAL THERAPY | Age: 35
End: 2020-06-10

## 2020-06-18 ENCOUNTER — HOSPITAL ENCOUNTER (OUTPATIENT)
Dept: ULTRASOUND IMAGING | Facility: HOSPITAL | Age: 35
Discharge: HOME | End: 2020-06-18
Attending: INTERNAL MEDICINE
Payer: COMMERCIAL

## 2020-06-18 ENCOUNTER — HOSPITAL ENCOUNTER (OUTPATIENT)
Dept: MAMMOGRAPHY | Facility: HOSPITAL | Age: 35
Discharge: HOME | End: 2020-06-18
Attending: INTERNAL MEDICINE
Payer: COMMERCIAL

## 2020-06-18 ENCOUNTER — TELEPHONE (OUTPATIENT)
Dept: NEUROLOGY | Facility: CLINIC | Age: 35
End: 2020-06-18

## 2020-06-18 DIAGNOSIS — D24.2 FIBROADENOMA OF BREAST, LEFT: ICD-10-CM

## 2020-06-18 PROCEDURE — 76642 ULTRASOUND BREAST LIMITED: CPT | Performed by: INTERNAL MEDICINE

## 2020-06-18 PROCEDURE — 77066 DX MAMMO INCL CAD BI: CPT | Performed by: INTERNAL MEDICINE

## 2020-06-18 PROCEDURE — 77062 BREAST TOMOSYNTHESIS BI: CPT | Performed by: INTERNAL MEDICINE

## 2020-06-19 ENCOUNTER — OFFICE VISIT (OUTPATIENT)
Dept: OBGYN CLINIC | Facility: CLINIC | Age: 35
End: 2020-06-19
Payer: COMMERCIAL

## 2020-06-19 VITALS
WEIGHT: 121.81 LBS | SYSTOLIC BLOOD PRESSURE: 120 MMHG | BODY MASS INDEX: 20.79 KG/M2 | DIASTOLIC BLOOD PRESSURE: 62 MMHG | HEIGHT: 64 IN

## 2020-06-19 DIAGNOSIS — Z01.419 WOMEN'S ANNUAL ROUTINE GYNECOLOGICAL EXAMINATION: Primary | ICD-10-CM

## 2020-06-19 DIAGNOSIS — N89.8 VAGINAL ITCHING: ICD-10-CM

## 2020-06-19 PROBLEM — D24.2 BILATERAL FIBROADENOMAS OF BREASTS: Status: ACTIVE | Noted: 2020-06-19

## 2020-06-19 PROBLEM — D24.1 BILATERAL FIBROADENOMAS OF BREASTS: Status: ACTIVE | Noted: 2020-06-19

## 2020-06-19 PROCEDURE — 99395 PREV VISIT EST AGE 18-39: CPT | Performed by: OBSTETRICS & GYNECOLOGY

## 2020-06-19 PROCEDURE — 87808 TRICHOMONAS ASSAY W/OPTIC: CPT | Performed by: OBSTETRICS & GYNECOLOGY

## 2020-06-19 PROCEDURE — 87205 SMEAR GRAM STAIN: CPT | Performed by: OBSTETRICS & GYNECOLOGY

## 2020-06-19 PROCEDURE — 87106 FUNGI IDENTIFICATION YEAST: CPT | Performed by: OBSTETRICS & GYNECOLOGY

## 2020-06-19 PROCEDURE — 88175 CYTOPATH C/V AUTO FLUID REDO: CPT | Performed by: OBSTETRICS & GYNECOLOGY

## 2020-06-19 PROCEDURE — 87624 HPV HI-RISK TYP POOLED RSLT: CPT | Performed by: OBSTETRICS & GYNECOLOGY

## 2020-06-19 NOTE — PROGRESS NOTES
GYN ANNUAL    2020  10:25 AM    Patient presents with: Annual: annual exam and pap smear   Vaginal Problem: pt c/o vaginal itching   . HPI: Patient is a 37year old  LMP 6/10/2020 presents for annual gyn exam and PAP.  Has concerns about vag OTHER SURGICAL HISTORY      wisdom teeth   • UPPER GI ENDOSCOPY,DIAGNOSIS  2008    elsewhere, negative   • UPPER GI ENDOSCOPY,DIAGNOSIS  5/29/15    esophageal ridges with distal esophageal rings, esophageal bx showed >50 eos/hpf, SB Bx negaitve, esophageal complaints  Musculoskeletal:no complaints        /62   Ht 64\"   Wt 121 lb 12.8 oz (55.2 kg)   LMP 06/10/2020   BMI 20.91 kg/m²     Exam:   GENERAL: well developed, well nourished, in no apparent distress  SKIN: no rashes, no lesions  HEENT: normal

## 2020-06-22 ENCOUNTER — PATIENT MESSAGE (OUTPATIENT)
Dept: OBGYN CLINIC | Facility: CLINIC | Age: 35
End: 2020-06-22

## 2020-06-22 RX ORDER — FLUCONAZOLE 150 MG/1
150 TABLET ORAL DAILY
Qty: 2 TABLET | Refills: 0 | Status: SHIPPED | OUTPATIENT
Start: 2020-06-22 | End: 2020-10-08 | Stop reason: ALTCHOICE

## 2020-06-22 NOTE — PROGRESS NOTES
Notes recorded by Tarun Smith MD on 6/22/2020 at 10:52 AM CDT  Your lab results show yeast.  Please take oral Diflucan 150 mg x 2 doses three days apart.       Mayela Hammond MD

## 2020-06-23 RX ORDER — FLUCONAZOLE 150 MG/1
TABLET ORAL
Qty: 2 TABLET | Refills: 0 | Status: SHIPPED | OUTPATIENT
Start: 2020-06-23 | End: 2020-10-08 | Stop reason: ALTCHOICE

## 2020-06-23 NOTE — TELEPHONE ENCOUNTER
From: Myah Stanley  To: Higinio Canavan, MD  Sent: 6/22/2020 2:16 PM CDT  Subject: Test Results Question    I have a question about GENITAL VAGINOSIS SCREEN resulted on 6/21/20, 7:18 AM.    Can you please send a prescription?  My pharmacy does not ha

## 2020-08-27 ENCOUNTER — ORDER TRANSCRIPTION (OUTPATIENT)
Dept: INTERNAL MEDICINE CLINIC | Facility: CLINIC | Age: 35
End: 2020-08-27

## 2020-08-27 DIAGNOSIS — E55.9 VITAMIN D DEFICIENCY: Primary | ICD-10-CM

## 2020-08-27 DIAGNOSIS — E61.1 IRON DEFICIENCY: ICD-10-CM

## 2020-10-08 ENCOUNTER — OFFICE VISIT (OUTPATIENT)
Dept: AUDIOLOGY | Facility: CLINIC | Age: 35
End: 2020-10-08
Payer: COMMERCIAL

## 2020-10-08 ENCOUNTER — OFFICE VISIT (OUTPATIENT)
Dept: OTOLARYNGOLOGY | Facility: CLINIC | Age: 35
End: 2020-10-08
Payer: COMMERCIAL

## 2020-10-08 VITALS
DIASTOLIC BLOOD PRESSURE: 60 MMHG | SYSTOLIC BLOOD PRESSURE: 89 MMHG | BODY MASS INDEX: 20.49 KG/M2 | WEIGHT: 120 LBS | HEART RATE: 69 BPM | HEIGHT: 64 IN

## 2020-10-08 DIAGNOSIS — H93.A2 PULSATILE TINNITUS OF LEFT EAR: ICD-10-CM

## 2020-10-08 DIAGNOSIS — H93.12: Primary | ICD-10-CM

## 2020-10-08 DIAGNOSIS — H93.19 TINNITUS, UNSPECIFIED LATERALITY: Primary | ICD-10-CM

## 2020-10-08 PROCEDURE — 92582 CONDITIONING PLAY AUDIOMETRY: CPT | Performed by: AUDIOLOGIST

## 2020-10-08 PROCEDURE — 3074F SYST BP LT 130 MM HG: CPT | Performed by: OTOLARYNGOLOGY

## 2020-10-08 PROCEDURE — 3008F BODY MASS INDEX DOCD: CPT | Performed by: OTOLARYNGOLOGY

## 2020-10-08 PROCEDURE — 3078F DIAST BP <80 MM HG: CPT | Performed by: OTOLARYNGOLOGY

## 2020-10-08 PROCEDURE — 99243 OFF/OP CNSLTJ NEW/EST LOW 30: CPT | Performed by: OTOLARYNGOLOGY

## 2020-10-08 PROCEDURE — 92567 TYMPANOMETRY: CPT | Performed by: AUDIOLOGIST

## 2020-10-08 RX ORDER — FLUTICASONE PROPIONATE 50 MCG
1 SPRAY, SUSPENSION (ML) NASAL 2 TIMES DAILY
Qty: 1 BOTTLE | Refills: 3 | Status: SHIPPED | OUTPATIENT
Start: 2020-10-08 | End: 2020-10-25 | Stop reason: ALTCHOICE

## 2020-10-08 RX ORDER — LORATADINE 10 MG/1
10 TABLET ORAL DAILY
Qty: 30 TABLET | Refills: 3 | Status: SHIPPED | OUTPATIENT
Start: 2020-10-08 | End: 2020-10-25 | Stop reason: ALTCHOICE

## 2020-10-08 RX ORDER — MONTELUKAST SODIUM 10 MG/1
10 TABLET ORAL NIGHTLY
Qty: 30 TABLET | Refills: 3 | Status: SHIPPED | OUTPATIENT
Start: 2020-10-08 | End: 2020-10-25 | Stop reason: ALTCHOICE

## 2020-10-08 RX ORDER — OMEPRAZOLE 20 MG/1
20 TABLET, DELAYED RELEASE ORAL DAILY
COMMUNITY
End: 2020-10-25 | Stop reason: ALTCHOICE

## 2020-10-08 NOTE — PROGRESS NOTES
Festus De La Vega is a 38year old female.   Patient presents with:  Consult: patient states when she lays on her left side she hers a pounding sound with a vibration ,gets louder and worse the longer she lays on that side ,it resolves if she lays on the r Social History Narrative      No H/O abuse       Family History   Problem Relation Age of Onset   • Lipids Father    • Hypertension Father    • Other (MS) Father    • Cancer Mother         Hodgkins   • Diabetes Maternal Grandfather    • Diabetes Paternal G PHYSICAL EXAM    BP (!) 89/60   Pulse 69   Ht 5' 4\" (1.626 m)   Wt 120 lb (54.4 kg)   LMP 06/10/2020   BMI 20.60 kg/m²        Constitutional Normal Overall appearance - Normal.   Psychiatric Normal Orientation - Oriented to time, place, person & Rfl: 1  ASSESSMENT AND PLAN    1. Ringing in the ears, left  - OP REFERRAL TO AUDIOLOGY    2. Pulsatile tinnitus of left ear  Pulsatile sensation of the left ear that does not seem to correlate with her on pulse.   We were not able to elicit replicate the s

## 2020-10-08 NOTE — PROGRESS NOTES
AUDIOGRAM     [facility] was referred for testing by Dr. Lamar Huge   8/9/1985  [id number]      Otoscopic inspection: right ear no cerumen; left ear no cerumen.        Tests/Procedures  Patient was tested via  standard insert earphones and a bone o

## 2020-10-25 ENCOUNTER — OFFICE VISIT (OUTPATIENT)
Dept: FAMILY MEDICINE CLINIC | Facility: CLINIC | Age: 35
End: 2020-10-25
Payer: COMMERCIAL

## 2020-10-25 VITALS
WEIGHT: 120 LBS | RESPIRATION RATE: 18 BRPM | TEMPERATURE: 98 F | HEIGHT: 64 IN | SYSTOLIC BLOOD PRESSURE: 92 MMHG | DIASTOLIC BLOOD PRESSURE: 68 MMHG | BODY MASS INDEX: 20.49 KG/M2 | OXYGEN SATURATION: 98 % | HEART RATE: 84 BPM

## 2020-10-25 DIAGNOSIS — Z20.822 CLOSE EXPOSURE TO COVID-19 VIRUS: ICD-10-CM

## 2020-10-25 DIAGNOSIS — J06.9 URI WITH COUGH AND CONGESTION: Primary | ICD-10-CM

## 2020-10-25 PROCEDURE — 3074F SYST BP LT 130 MM HG: CPT | Performed by: PHYSICIAN ASSISTANT

## 2020-10-25 PROCEDURE — 99213 OFFICE O/P EST LOW 20 MIN: CPT | Performed by: PHYSICIAN ASSISTANT

## 2020-10-25 PROCEDURE — 3008F BODY MASS INDEX DOCD: CPT | Performed by: PHYSICIAN ASSISTANT

## 2020-10-25 PROCEDURE — 3078F DIAST BP <80 MM HG: CPT | Performed by: PHYSICIAN ASSISTANT

## 2020-10-25 NOTE — PROGRESS NOTES
CHIEF COMPLAINT:   Patient presents with:  Cold: X 2 days, exposed 1 week ago,  chills, light cough,         HPI:   Ahmet Kim is a 38year old female who presents for cold like symptoms for two days. +chills and achy body.  No fever, headache. +mil or exudates   NECK: supple, non-tender. LUNGS: Normal respiratory rate. Normal effort. No wheezing. No rales or crackles. . No decreased BS. CARDIO: RRR without murmur  LYMPH: No cervical or supraclavicular lymphadenopathy.        ASSESSMENT AND PLAN: follow all care and home isolation instructions. Your test results will be called to you from an [employer] representative. If you have not received a call within 2 business days, please call your primary care provider or check MyChart for results. frequently. Your healthcare provider can help direct you on next steps. If you have not been exposed or are not aware of an exposure to COVID-19 and are concerned about your symptoms, please contact your health care provider with any questions.     Home donating plasma is very similar to donating blood. Reynaldo Krause (a large blood research institute in [address] [place] [address] and one of [facility] blood product suppliers) is coordinating plasma donations.     If you would be interested in donating your plasma to hel

## 2020-10-25 NOTE — PATIENT INSTRUCTIONS
Coronavirus Disease 2019 (COVID-19)     [facility] is committed to the safety and well-being of our patients, members, employees, and communities.  As concerns arise about the new strain of coronavirus that causes COVID-19, [employer] a medical appointment, call the healthcare provider ahead of time and tell them that you have or may have COVID-19.  5. For medical emergencies, call 911 and notify the dispatch personnel that you have or may have COVID-19.   6. Cover your cough and sneeze days have passed since symptoms first appeared OR if asymptomatic patient or date of symptom onset is unclear then use 10 days post COVID test date.    · At least 20 days have passed for severe illness (requiring hospitalization) OR if you are immunocomprom required to have a repeat COVID-19 test in order to be eligible to donate. If you’re instructed by Jean Grijalva that a repeat test is required, please contact the [facility] COVID-19 Nurse Triage Line at [phone].     Additional Information      You can

## 2020-10-26 ENCOUNTER — TELEPHONE (OUTPATIENT)
Dept: OTOLARYNGOLOGY | Facility: CLINIC | Age: 35
End: 2020-10-26

## 2020-12-03 ENCOUNTER — TELEPHONE (OUTPATIENT)
Dept: INTERNAL MEDICINE CLINIC | Facility: CLINIC | Age: 35
End: 2020-12-03

## 2020-12-03 NOTE — TELEPHONE ENCOUNTER
Patient is having abdominal pain for the past two weeks. Pain is on left lower side, loose  Stools started 2 days ago. No fevers and no vomiting. The pain is constant. Patient has not tried anything for the pain.        She is requesting to be seen toda

## 2020-12-03 NOTE — TELEPHONE ENCOUNTER
Spoke to patient. Pain has been persisting the past two weeks. Pain is tolerable, but the persistence is what worries her. Rates pain a 6 out of 10.     Does not want to do virtual as MD would not be able to assess her completely (ie palpate abdomen) and

## 2020-12-07 NOTE — TELEPHONE ENCOUNTER
Becki Cerrato  You 34 minutes ago (10:18 AM)     LM for pt to call the office b/c of some cancelled appts.

## 2020-12-09 ENCOUNTER — HOSPITAL ENCOUNTER (OUTPATIENT)
Dept: MRI IMAGING | Age: 35
Discharge: HOME | End: 2020-12-09
Attending: OTOLARYNGOLOGY
Payer: COMMERCIAL

## 2020-12-09 DIAGNOSIS — H93.A9 PULSATILE TINNITUS: ICD-10-CM

## 2020-12-09 PROCEDURE — A9575 INJ GADOTERATE MEGLUMI 0.1ML: HCPCS | Performed by: OTOLARYNGOLOGY

## 2020-12-09 PROCEDURE — 70544 MR ANGIOGRAPHY HEAD W/O DYE: CPT | Performed by: OTOLARYNGOLOGY

## 2020-12-09 PROCEDURE — 70549 MR ANGIOGRAPH NECK W/O&W/DYE: CPT | Performed by: OTOLARYNGOLOGY

## 2021-02-09 ENCOUNTER — IMMUNIZATION (OUTPATIENT)
Dept: LAB | Facility: HOSPITAL | Age: 36
End: 2021-02-09
Attending: EMERGENCY MEDICINE
Payer: COMMERCIAL

## 2021-02-09 DIAGNOSIS — Z23 NEED FOR VACCINATION: Primary | ICD-10-CM

## 2021-02-09 PROCEDURE — 0011A SARSCOV2 VAC 100MCG/0.5ML IM: CPT

## 2021-03-09 ENCOUNTER — OFFICE VISIT (OUTPATIENT)
Dept: INTERNAL MEDICINE CLINIC | Facility: CLINIC | Age: 36
End: 2021-03-09
Payer: COMMERCIAL

## 2021-03-09 VITALS
HEIGHT: 64 IN | OXYGEN SATURATION: 99 % | WEIGHT: 122.81 LBS | BODY MASS INDEX: 20.97 KG/M2 | SYSTOLIC BLOOD PRESSURE: 100 MMHG | DIASTOLIC BLOOD PRESSURE: 69 MMHG | HEART RATE: 81 BPM

## 2021-03-09 DIAGNOSIS — K20.0 EOSINOPHILIC ESOPHAGITIS: ICD-10-CM

## 2021-03-09 DIAGNOSIS — E55.9 VITAMIN D DEFICIENCY: ICD-10-CM

## 2021-03-09 DIAGNOSIS — F41.9 ANXIETY: ICD-10-CM

## 2021-03-09 DIAGNOSIS — Z00.00 GENERAL MEDICAL EXAM: Primary | ICD-10-CM

## 2021-03-09 DIAGNOSIS — E61.1 IRON DEFICIENCY: ICD-10-CM

## 2021-03-09 DIAGNOSIS — R53.83 FATIGUE, UNSPECIFIED TYPE: ICD-10-CM

## 2021-03-09 PROBLEM — Z01.419 WOMEN'S ANNUAL ROUTINE GYNECOLOGICAL EXAMINATION: Status: RESOLVED | Noted: 2018-04-24 | Resolved: 2021-03-09

## 2021-03-09 PROBLEM — N89.8 VAGINAL ITCHING: Status: RESOLVED | Noted: 2020-06-19 | Resolved: 2021-03-09

## 2021-03-09 PROCEDURE — 99395 PREV VISIT EST AGE 18-39: CPT | Performed by: INTERNAL MEDICINE

## 2021-03-09 PROCEDURE — 3074F SYST BP LT 130 MM HG: CPT | Performed by: INTERNAL MEDICINE

## 2021-03-09 PROCEDURE — 3078F DIAST BP <80 MM HG: CPT | Performed by: INTERNAL MEDICINE

## 2021-03-09 PROCEDURE — 3008F BODY MASS INDEX DOCD: CPT | Performed by: INTERNAL MEDICINE

## 2021-03-09 NOTE — PROGRESS NOTES
Cameron Ramos is a 38year old female who presents for a complete physical exam.    Patient has eosinophilic esophagitis, Iron deficiency, vit D deficiency , anxiety. Had been doing well. Maria Del Carmen symptoms of heartburn.  She had reduced omeprazole to 10 Past Surgical History:   Procedure Laterality Date   • BENIGN BIOPSY LEFT  age 23   • BENIGN BIOPSY RIGHT  age 21    2 x's   • MARGRET LOCALIZATION WIRE 1 SITE LEFT (CPT=19281)      at age 23   • MARGRET LOCALIZATION WIRE 1 SITE RIGHT (CPT=19281)      2x's at ag Patient Position: Sitting, Cuff Size: adult)   Pulse 81   Ht 5' 4\" (1.626 m)   Wt 122 lb 12.8 oz (55.7 kg)   LMP 10/05/2020 (Exact Date)   SpO2 99%   BMI 21.08 kg/m²   Body mass index is 21.08 kg/m².    GENERAL: well developed, well nourished,in no apparen

## 2021-03-16 ENCOUNTER — LAB ENCOUNTER (OUTPATIENT)
Dept: LAB | Facility: HOSPITAL | Age: 36
End: 2021-03-16
Attending: INTERNAL MEDICINE
Payer: COMMERCIAL

## 2021-03-16 DIAGNOSIS — Z00.00 GENERAL MEDICAL EXAM: ICD-10-CM

## 2021-03-16 DIAGNOSIS — E55.9 VITAMIN D DEFICIENCY: ICD-10-CM

## 2021-03-16 DIAGNOSIS — R53.83 FATIGUE, UNSPECIFIED TYPE: ICD-10-CM

## 2021-03-16 DIAGNOSIS — E61.1 IRON DEFICIENCY: ICD-10-CM

## 2021-03-16 DIAGNOSIS — K20.0 EOSINOPHILIC ESOPHAGITIS: ICD-10-CM

## 2021-03-16 LAB
ALBUMIN SERPL-MCNC: 3.7 G/DL (ref 3.4–5)
ALBUMIN/GLOB SERPL: 1.4 {RATIO} (ref 1–2)
ALP LIVER SERPL-CCNC: 40 U/L
ALT SERPL-CCNC: 18 U/L
ANION GAP SERPL CALC-SCNC: 6 MMOL/L (ref 0–18)
AST SERPL-CCNC: 17 U/L (ref 15–37)
BASOPHILS # BLD AUTO: 0.03 X10(3) UL (ref 0–0.2)
BASOPHILS NFR BLD AUTO: 0.5 %
BILIRUB SERPL-MCNC: 0.5 MG/DL (ref 0.1–2)
BUN BLD-MCNC: 13 MG/DL (ref 7–18)
BUN/CREAT SERPL: 15.1 (ref 10–20)
CALCIUM BLD-MCNC: 8.4 MG/DL (ref 8.5–10.1)
CHLORIDE SERPL-SCNC: 106 MMOL/L (ref 98–112)
CHOLEST SMN-MCNC: 142 MG/DL (ref ?–200)
CO2 SERPL-SCNC: 26 MMOL/L (ref 21–32)
CREAT BLD-MCNC: 0.86 MG/DL
DEPRECATED HBV CORE AB SER IA-ACNC: 11 NG/ML
DEPRECATED RDW RBC AUTO: 40.8 FL (ref 35.1–46.3)
EOSINOPHIL # BLD AUTO: 0.07 X10(3) UL (ref 0–0.7)
EOSINOPHIL NFR BLD AUTO: 1.2 %
ERYTHROCYTE [DISTWIDTH] IN BLOOD BY AUTOMATED COUNT: 13.9 % (ref 11–15)
FOLATE SERPL-MCNC: >20 NG/ML (ref 8.7–?)
GLOBULIN PLAS-MCNC: 2.7 G/DL (ref 2.8–4.4)
GLUCOSE BLD-MCNC: 92 MG/DL (ref 70–99)
HCT VFR BLD AUTO: 35.4 %
HDLC SERPL-MCNC: 67 MG/DL (ref 40–59)
HGB BLD-MCNC: 11.2 G/DL
IMM GRANULOCYTES # BLD AUTO: 0.01 X10(3) UL (ref 0–1)
IMM GRANULOCYTES NFR BLD: 0.2 %
IRON SATURATION: 25 %
IRON SERPL-MCNC: 105 UG/DL
LDLC SERPL CALC-MCNC: 65 MG/DL (ref ?–100)
LYMPHOCYTES # BLD AUTO: 1.49 X10(3) UL (ref 1–4)
LYMPHOCYTES NFR BLD AUTO: 26.2 %
M PROTEIN MFR SERPL ELPH: 6.4 G/DL (ref 6.4–8.2)
MCH RBC QN AUTO: 25.5 PG (ref 26–34)
MCHC RBC AUTO-ENTMCNC: 31.6 G/DL (ref 31–37)
MCV RBC AUTO: 80.6 FL
MONOCYTES # BLD AUTO: 0.36 X10(3) UL (ref 0.1–1)
MONOCYTES NFR BLD AUTO: 6.3 %
NEUTROPHILS # BLD AUTO: 3.73 X10 (3) UL (ref 1.5–7.7)
NEUTROPHILS # BLD AUTO: 3.73 X10(3) UL (ref 1.5–7.7)
NEUTROPHILS NFR BLD AUTO: 65.6 %
NONHDLC SERPL-MCNC: 75 MG/DL (ref ?–130)
OSMOLALITY SERPL CALC.SUM OF ELEC: 286 MOSM/KG (ref 275–295)
PATIENT FASTING Y/N/NP: YES
PATIENT FASTING Y/N/NP: YES
PLATELET # BLD AUTO: 272 10(3)UL (ref 150–450)
POTASSIUM SERPL-SCNC: 3.6 MMOL/L (ref 3.5–5.1)
RBC # BLD AUTO: 4.39 X10(6)UL
SODIUM SERPL-SCNC: 138 MMOL/L (ref 136–145)
TOTAL IRON BINDING CAPACITY: 428 UG/DL (ref 240–450)
TRANSFERRIN SERPL-MCNC: 287 MG/DL (ref 200–360)
TRIGL SERPL-MCNC: 51 MG/DL (ref 30–149)
TSI SER-ACNC: 1.56 MIU/ML (ref 0.36–3.74)
VIT B12 SERPL-MCNC: 459 PG/ML (ref 193–986)
VLDLC SERPL CALC-MCNC: 10 MG/DL (ref 0–30)
WBC # BLD AUTO: 5.7 X10(3) UL (ref 4–11)

## 2021-03-16 PROCEDURE — 82627 DEHYDROEPIANDROSTERONE: CPT

## 2021-03-16 PROCEDURE — 84466 ASSAY OF TRANSFERRIN: CPT

## 2021-03-16 PROCEDURE — 84443 ASSAY THYROID STIM HORMONE: CPT

## 2021-03-16 PROCEDURE — 82306 VITAMIN D 25 HYDROXY: CPT

## 2021-03-16 PROCEDURE — 82746 ASSAY OF FOLIC ACID SERUM: CPT

## 2021-03-16 PROCEDURE — 82728 ASSAY OF FERRITIN: CPT

## 2021-03-16 PROCEDURE — 83540 ASSAY OF IRON: CPT

## 2021-03-16 PROCEDURE — 80053 COMPREHEN METABOLIC PANEL: CPT

## 2021-03-16 PROCEDURE — 80061 LIPID PANEL: CPT

## 2021-03-16 PROCEDURE — 82607 VITAMIN B-12: CPT

## 2021-03-16 PROCEDURE — 36415 COLL VENOUS BLD VENIPUNCTURE: CPT

## 2021-03-16 PROCEDURE — 85025 COMPLETE CBC W/AUTO DIFF WBC: CPT

## 2021-03-17 LAB — 25(OH)D3 SERPL-MCNC: 59.4 NG/ML (ref 30–100)

## 2021-03-19 LAB — DEHYDROEPIANDROSTERONE BY TMS: 2.42 NG/ML

## 2021-08-03 ENCOUNTER — OFFICE VISIT (OUTPATIENT)
Dept: NEUROLOGY | Facility: CLINIC | Age: 36
End: 2021-08-03
Payer: COMMERCIAL

## 2021-08-03 VITALS
HEART RATE: 76 BPM | DIASTOLIC BLOOD PRESSURE: 64 MMHG | OXYGEN SATURATION: 96 % | WEIGHT: 120 LBS | HEIGHT: 64 IN | BODY MASS INDEX: 20.49 KG/M2 | SYSTOLIC BLOOD PRESSURE: 102 MMHG

## 2021-08-03 DIAGNOSIS — M51.26 BULGE OF LUMBAR DISC WITHOUT MYELOPATHY: ICD-10-CM

## 2021-08-03 DIAGNOSIS — R20.0 NUMBNESS AND TINGLING OF BOTH FEET: ICD-10-CM

## 2021-08-03 DIAGNOSIS — R20.2 NUMBNESS AND TINGLING OF BOTH FEET: ICD-10-CM

## 2021-08-03 DIAGNOSIS — M24.851 SNAPPING HIP SYNDROME, RIGHT: ICD-10-CM

## 2021-08-03 DIAGNOSIS — M76.30 IT BAND SYNDROME, UNSPECIFIED LATERALITY: ICD-10-CM

## 2021-08-03 DIAGNOSIS — S76.311A STRAIN OF RIGHT HAMSTRING, INITIAL ENCOUNTER: ICD-10-CM

## 2021-08-03 DIAGNOSIS — M51.16 LUMBAR DISC HERNIATION WITH RADICULOPATHY: ICD-10-CM

## 2021-08-03 DIAGNOSIS — M70.71 ISCHIAL BURSITIS OF RIGHT SIDE: ICD-10-CM

## 2021-08-03 DIAGNOSIS — M48.061 LUMBAR FORAMINAL STENOSIS: ICD-10-CM

## 2021-08-03 DIAGNOSIS — M54.59 MECHANICAL LOW BACK PAIN: Primary | ICD-10-CM

## 2021-08-03 PROCEDURE — 99214 OFFICE O/P EST MOD 30 MIN: CPT | Performed by: PHYSICAL MEDICINE & REHABILITATION

## 2021-08-03 PROCEDURE — 3078F DIAST BP <80 MM HG: CPT | Performed by: PHYSICAL MEDICINE & REHABILITATION

## 2021-08-03 PROCEDURE — 3008F BODY MASS INDEX DOCD: CPT | Performed by: PHYSICAL MEDICINE & REHABILITATION

## 2021-08-03 PROCEDURE — 3074F SYST BP LT 130 MM HG: CPT | Performed by: PHYSICAL MEDICINE & REHABILITATION

## 2021-08-03 NOTE — PROGRESS NOTES
[address]  Progress Note    CHIEF COMPLAINT:  Patient presents with:  Hip Pain: Snapping hip on the right side. Upper hip pain. Pain is a 6/10.  Worse when she walks and numbness on right foot, LOV: 5/28/202 (CPT=19281)      at age 23   • MARGRET LOCALIZATION WIRE 1 SITE RIGHT (CPT=19281)      2x's at age 23.   • OTHER SURGICAL HISTORY      benign right breast mass excision, two benign left breast tumors removed   • OTHER SURGICAL HISTORY      wisdom teeth   • OTH Gastrointestinal: Negative. Genitourinary: Negative. Musculoskeletal: As per HPI  Skin: Negative. Neurological: As per HPI  Endo/Heme/Allergies: Negative. Psychiatric/Behavioral: Negative. All other systems reviewed and are negative.  Per ischial tuberosity. No pain or discomfort during testing of the right hamstring muscles.   Anterior snapping hip when evaluating the right iliopsoas tendon and bursa with flexion and extension of the hip  ROM: intact to all planes of motion  Hip Grind Test - 2.0 mg/dL Final   • Total Protein 03/16/2021 6.4  6.4 - 8.2 g/dL Final   • Albumin 03/16/2021 3.7  3.4 - 5.0 g/dL Final   • Globulin  03/16/2021 2.7* 2.8 - 4.4 g/dL Final   • A/G Ratio 03/16/2021 1.4  1.0 - 2.0 Final   • FASTING 03/16/2021 Yes   Final for several weeks.       TECHNIQUE: Lumbar spine radiographs (minimum 4 views)         FINDINGS:       ALIGNMENT:   Normal alignment.     VERTEBRAL BODIES:   Normal.  No fracture or bony lesion. DISC SPACES: Normal.  No significant disc space narrowing. radiculopathy  Ischial bursitis of right side  Strain of right hamstring, initial encounter  Snapping hip syndrome, right    Tony Pinon MD  Physical Medicine and Rehabilitation/Sports Medicine  MEDICAL CENTER [facility]

## 2021-08-03 NOTE — PATIENT INSTRUCTIONS
1) Start formal physical therapy as oon as possible at the [facility] [employer]. Ask for Joseph  2) Continua with a home exercise program  3) Tylenol 500-1000 mg every 6-8 hours as needed for pain. No more than 3000 mg daily.   4) Follow up with me in

## 2021-08-25 ENCOUNTER — TELEPHONE (OUTPATIENT)
Dept: PHYSICAL THERAPY | Facility: HOSPITAL | Age: 36
End: 2021-08-25

## 2021-09-02 ENCOUNTER — APPOINTMENT (OUTPATIENT)
Dept: PHYSICAL THERAPY | Facility: HOSPITAL | Age: 36
End: 2021-09-02
Attending: PHYSICAL MEDICINE & REHABILITATION
Payer: COMMERCIAL

## 2021-09-07 ENCOUNTER — APPOINTMENT (OUTPATIENT)
Dept: PHYSICAL THERAPY | Facility: HOSPITAL | Age: 36
End: 2021-09-07
Attending: PHYSICAL MEDICINE & REHABILITATION
Payer: COMMERCIAL

## 2021-09-09 ENCOUNTER — OFFICE VISIT (OUTPATIENT)
Dept: PHYSICAL THERAPY | Facility: HOSPITAL | Age: 36
End: 2021-09-09
Attending: PHYSICAL MEDICINE & REHABILITATION
Payer: COMMERCIAL

## 2021-09-09 DIAGNOSIS — M24.851 SNAPPING HIP SYNDROME, RIGHT: ICD-10-CM

## 2021-09-09 DIAGNOSIS — R20.2 NUMBNESS AND TINGLING OF BOTH FEET: ICD-10-CM

## 2021-09-09 DIAGNOSIS — S76.311A STRAIN OF RIGHT HAMSTRING, INITIAL ENCOUNTER: ICD-10-CM

## 2021-09-09 DIAGNOSIS — R20.0 NUMBNESS AND TINGLING OF BOTH FEET: ICD-10-CM

## 2021-09-09 DIAGNOSIS — M70.71 ISCHIAL BURSITIS OF RIGHT SIDE: ICD-10-CM

## 2021-09-09 DIAGNOSIS — M51.16 LUMBAR DISC HERNIATION WITH RADICULOPATHY: ICD-10-CM

## 2021-09-09 DIAGNOSIS — M48.061 LUMBAR FORAMINAL STENOSIS: ICD-10-CM

## 2021-09-09 DIAGNOSIS — M51.26 BULGE OF LUMBAR DISC WITHOUT MYELOPATHY: ICD-10-CM

## 2021-09-09 DIAGNOSIS — M76.30 IT BAND SYNDROME, UNSPECIFIED LATERALITY: ICD-10-CM

## 2021-09-09 DIAGNOSIS — M54.59 MECHANICAL LOW BACK PAIN: ICD-10-CM

## 2021-09-09 PROCEDURE — 97162 PT EVAL MOD COMPLEX 30 MIN: CPT

## 2021-09-09 PROCEDURE — 97110 THERAPEUTIC EXERCISES: CPT

## 2021-09-09 NOTE — PROGRESS NOTES
LUMBAR SPINE EVALUATION:   Referring Physician: Dr. Valerie Niño  Diagnosis: Mechanical low back pain (M54.5)  Numbness and tingling of both feet (R20.0,R20.2)  It band syndrome, unspecified laterality (M76.30)  Lumbar foraminal stenosis (M48.061)  Bulge of l SITE LEFT (CPT=19281)      at age 23   • MARGRET LOCALIZATION WIRE 1 SITE RIGHT (CPT=19281)      2x's at age 23.   • OTHER SURGICAL HISTORY      benign right breast mass excision, two benign left breast tumors removed   • OTHER SURGICAL HISTORY      wisdom yunier and of likely physical therapy interventions.           Makenna Palmer would benefit from skilled Physical Therapy to address the above impairments to maximize functional tatus    Precautions:  None    OBJECTIVE:   Observation/Posture:  Lumbar Lordosis: Increased, t Total Treatment Time: 35 min     Based on clinical rationale and outcome measures, this evaluation involved Moderate Complexity decision making due to 1-2 personal factors/comorbidities, 4+ body structures involved/activity limitations, and evolving sympto

## 2021-09-14 ENCOUNTER — OFFICE VISIT (OUTPATIENT)
Dept: PHYSICAL THERAPY | Facility: HOSPITAL | Age: 36
End: 2021-09-14
Attending: PHYSICAL MEDICINE & REHABILITATION
Payer: COMMERCIAL

## 2021-09-14 PROCEDURE — 97110 THERAPEUTIC EXERCISES: CPT

## 2021-09-14 PROCEDURE — 97140 MANUAL THERAPY 1/> REGIONS: CPT

## 2021-09-14 NOTE — PROGRESS NOTES
Dx: Mechanical low back pain (M54.5)  Numbness and tingling of both feet (R20.0,R20.2)  It band syndrome, unspecified laterality (M76.30)  Lumbar foraminal stenosis (M48.061)  Bulge of lumbar disc without myelopathy (M51.26)  Lumbar disc herniation with ra 2/10 pain or less to improve community ADL performance. Plan:  Continue with POC with focus on reducing lumbar derangement.      Charges: Marissa 2 Manual 1Total Timed Treatment: 45 min     Total Treatment Time: 45 min

## 2021-09-16 ENCOUNTER — OFFICE VISIT (OUTPATIENT)
Dept: PHYSICAL THERAPY | Facility: HOSPITAL | Age: 36
End: 2021-09-16
Attending: PHYSICAL MEDICINE & REHABILITATION
Payer: COMMERCIAL

## 2021-09-16 PROCEDURE — 97140 MANUAL THERAPY 1/> REGIONS: CPT

## 2021-09-16 PROCEDURE — 97110 THERAPEUTIC EXERCISES: CPT

## 2021-09-16 NOTE — PROGRESS NOTES
Dx: Mechanical low back pain (M54.5)  Numbness and tingling of both feet (R20.0,R20.2)  It band syndrome, unspecified laterality (M76.30)  Lumbar foraminal stenosis (M48.061)  Bulge of lumbar disc without myelopathy (M51.26)  Lumbar disc herniation with ra Sets, Perform 1 Times a Day    Assessment:   Seema Colin Lizeth will have her care transferred to Shira Hawk for all visits after 9/17.  Taryn Glezois continues to demonstrate signs and symptoms consistent with likely derangement presentation with posi

## 2021-09-20 ENCOUNTER — TELEPHONE (OUTPATIENT)
Dept: PHYSICAL THERAPY | Facility: HOSPITAL | Age: 36
End: 2021-09-20

## 2021-09-21 ENCOUNTER — APPOINTMENT (OUTPATIENT)
Dept: PHYSICAL THERAPY | Facility: HOSPITAL | Age: 36
End: 2021-09-21
Attending: PHYSICAL MEDICINE & REHABILITATION
Payer: COMMERCIAL

## 2021-09-23 ENCOUNTER — APPOINTMENT (OUTPATIENT)
Dept: PHYSICAL THERAPY | Facility: HOSPITAL | Age: 36
End: 2021-09-23
Attending: PHYSICAL MEDICINE & REHABILITATION
Payer: COMMERCIAL

## 2021-09-27 ENCOUNTER — APPOINTMENT (OUTPATIENT)
Dept: PHYSICAL THERAPY | Facility: HOSPITAL | Age: 36
End: 2021-09-27
Attending: INTERNAL MEDICINE
Payer: COMMERCIAL

## 2021-09-28 ENCOUNTER — APPOINTMENT (OUTPATIENT)
Dept: PHYSICAL THERAPY | Facility: HOSPITAL | Age: 36
End: 2021-09-28
Attending: PHYSICAL MEDICINE & REHABILITATION
Payer: COMMERCIAL

## 2021-09-28 ENCOUNTER — OFFICE VISIT (OUTPATIENT)
Dept: PHYSICAL THERAPY | Facility: HOSPITAL | Age: 36
End: 2021-09-28
Payer: COMMERCIAL

## 2021-09-28 PROCEDURE — 97110 THERAPEUTIC EXERCISES: CPT | Performed by: PHYSICAL THERAPIST

## 2021-09-28 PROCEDURE — 97140 MANUAL THERAPY 1/> REGIONS: CPT | Performed by: PHYSICAL THERAPIST

## 2021-09-28 NOTE — PROGRESS NOTES
Dx: Mechanical low back pain (M54.5)  Numbness and tingling of both feet (R20.0,R20.2)  It band syndrome, unspecified laterality (M76.30)  Lumbar foraminal stenosis (M48.061)  Bulge of lumbar disc without myelopathy (M51.26)  Lumbar disc herniation with ra Sets, Perform 1 Times a Day    Multifidi Punch -  Repeat 15 Times, Hold 1 Second(s), Complete 2 Sets, Perform 2 Times a Day    CAT AND CAMEL -  Repeat 15 Times, Hold 1 Second(s), Complete 2 Sets, Perform 1 Times a Day    TRUNK EXTENSION - TOWEL - AROM - MO

## 2021-09-30 ENCOUNTER — APPOINTMENT (OUTPATIENT)
Dept: PHYSICAL THERAPY | Facility: HOSPITAL | Age: 36
End: 2021-09-30
Attending: PHYSICAL MEDICINE & REHABILITATION
Payer: COMMERCIAL

## 2021-10-05 ENCOUNTER — APPOINTMENT (OUTPATIENT)
Dept: PHYSICAL THERAPY | Facility: HOSPITAL | Age: 36
End: 2021-10-05
Attending: PHYSICAL MEDICINE & REHABILITATION
Payer: COMMERCIAL

## 2021-10-07 ENCOUNTER — APPOINTMENT (OUTPATIENT)
Dept: PHYSICAL THERAPY | Facility: HOSPITAL | Age: 36
End: 2021-10-07
Attending: PHYSICAL MEDICINE & REHABILITATION
Payer: COMMERCIAL

## 2021-10-12 ENCOUNTER — LAB ENCOUNTER (OUTPATIENT)
Dept: LAB | Facility: HOSPITAL | Age: 36
End: 2021-10-12
Attending: PREVENTIVE MEDICINE
Payer: COMMERCIAL

## 2021-10-19 ENCOUNTER — LAB ENCOUNTER (OUTPATIENT)
Dept: LAB | Facility: HOSPITAL | Age: 36
End: 2021-10-19
Attending: PREVENTIVE MEDICINE
Payer: COMMERCIAL

## 2021-10-19 ENCOUNTER — OFFICE VISIT (OUTPATIENT)
Dept: PHYSICAL THERAPY | Facility: HOSPITAL | Age: 36
End: 2021-10-19
Attending: INTERNAL MEDICINE
Payer: COMMERCIAL

## 2021-10-19 DIAGNOSIS — Z00.00 ROUTINE GENERAL MEDICAL EXAMINATION AT A HEALTH CARE FACILITY: Primary | ICD-10-CM

## 2021-10-19 DIAGNOSIS — Z00.00 ROUTINE GENERAL MEDICAL EXAMINATION AT A HEALTH CARE FACILITY: ICD-10-CM

## 2021-10-19 PROCEDURE — 97112 NEUROMUSCULAR REEDUCATION: CPT | Performed by: PHYSICAL THERAPIST

## 2021-10-19 PROCEDURE — 97140 MANUAL THERAPY 1/> REGIONS: CPT | Performed by: PHYSICAL THERAPIST

## 2021-10-19 PROCEDURE — 97110 THERAPEUTIC EXERCISES: CPT | Performed by: PHYSICAL THERAPIST

## 2021-10-19 NOTE — PROGRESS NOTES
Dx: Mechanical low back pain (M54.5)  Numbness and tingling of both feet (R20.0,R20.2)  It band syndrome, unspecified laterality (M76.30)  Lumbar foraminal stenosis (M48.061)  Bulge of lumbar disc without myelopathy (M51.26)  Lumbar disc herniation with ra Exercise Program    ELASTIC BAND ROWS  -  Repeat 15 Times, Hold 1 Second(s), Complete 2 Sets, Perform 1 Times a Day    ELASTIC BAND SCAPULAR RETRACTIONS WITH MINI SHOULDER EXTENSIONS -  Repeat 15 Times, Hold 1 Second(s), Complete 2 Sets, Perform 1 Times a

## 2021-10-20 ENCOUNTER — OFFICE VISIT (OUTPATIENT)
Dept: OBGYN CLINIC | Facility: CLINIC | Age: 36
End: 2021-10-20
Payer: COMMERCIAL

## 2021-10-20 VITALS
DIASTOLIC BLOOD PRESSURE: 58 MMHG | BODY MASS INDEX: 20.86 KG/M2 | HEIGHT: 64 IN | WEIGHT: 122.19 LBS | SYSTOLIC BLOOD PRESSURE: 100 MMHG

## 2021-10-20 DIAGNOSIS — R10.2 PELVIC PAIN: ICD-10-CM

## 2021-10-20 DIAGNOSIS — Z87.42 HISTORY OF OVARIAN CYST: ICD-10-CM

## 2021-10-20 DIAGNOSIS — Z01.419 WOMEN'S ANNUAL ROUTINE GYNECOLOGICAL EXAMINATION: Primary | ICD-10-CM

## 2021-10-20 PROCEDURE — 3078F DIAST BP <80 MM HG: CPT | Performed by: OBSTETRICS & GYNECOLOGY

## 2021-10-20 PROCEDURE — 99213 OFFICE O/P EST LOW 20 MIN: CPT | Performed by: OBSTETRICS & GYNECOLOGY

## 2021-10-20 PROCEDURE — 3008F BODY MASS INDEX DOCD: CPT | Performed by: OBSTETRICS & GYNECOLOGY

## 2021-10-20 PROCEDURE — 3074F SYST BP LT 130 MM HG: CPT | Performed by: OBSTETRICS & GYNECOLOGY

## 2021-10-20 PROCEDURE — 99395 PREV VISIT EST AGE 18-39: CPT | Performed by: OBSTETRICS & GYNECOLOGY

## 2021-10-20 RX ORDER — OMEPRAZOLE 10 MG/1
10 CAPSULE, DELAYED RELEASE ORAL DAILY
COMMUNITY
End: 2022-01-11

## 2021-10-20 NOTE — PROGRESS NOTES
GYN ANNUAL    10/20/2021  11:33 AM    Patient presents with: Annual: annual exam   Abdominal Pain: pt c/o occ right lower abdominal pain   .     HPI: Patient is a 43year old  LMP 10/16/21 presents for annual gyn exam with concerns about recurrrent low serum IgA on celiac panel testing   • Iron deficiency anemia 08/2017    in pregnancy    • Palpitations      Past Surgical History:   Procedure Laterality Date   • BENIGN BIOPSY LEFT  age 23   • BENIGN BIOPSY RIGHT  age 21    2 x's   • MARGRET LOCALIZATION positives and negatives noted in the HPI        /58   Ht 64\"   Wt 122 lb 3.2 oz (55.4 kg)   LMP 10/16/2021 (Exact Date)   BMI 20.98 kg/m²     Exam:   GENERAL: well developed, well nourished, in no apparent distress  SKIN: no rashes, no lesions  HEEN

## 2021-10-21 ENCOUNTER — OFFICE VISIT (OUTPATIENT)
Dept: PHYSICAL THERAPY | Facility: HOSPITAL | Age: 36
End: 2021-10-21
Attending: INTERNAL MEDICINE
Payer: COMMERCIAL

## 2021-10-21 PROCEDURE — 97140 MANUAL THERAPY 1/> REGIONS: CPT | Performed by: PHYSICAL THERAPIST

## 2021-10-21 PROCEDURE — 97110 THERAPEUTIC EXERCISES: CPT | Performed by: PHYSICAL THERAPIST

## 2021-10-21 NOTE — PROGRESS NOTES
Dx: Mechanical low back pain (M54.5)  Numbness and tingling of both feet (R20.0,R20.2)  It band syndrome, unspecified laterality (M76.30)  Lumbar foraminal stenosis (M48.061)  Bulge of lumbar disc without myelopathy (M51.26)  Lumbar disc herniation with ra Program    ELASTIC BAND ROWS  -  Repeat 15 Times, Hold 1 Second(s), Complete 2 Sets, Perform 1 Times a Day    ELASTIC BAND SCAPULAR RETRACTIONS WITH MINI SHOULDER EXTENSIONS -  Repeat 15 Times, Hold 1 Second(s), Complete 2 Sets, Perform 1 Times a Day    Mu

## 2021-10-22 ENCOUNTER — ULTRASOUND ENCOUNTER (OUTPATIENT)
Dept: OBGYN CLINIC | Facility: CLINIC | Age: 36
End: 2021-10-22
Payer: COMMERCIAL

## 2021-10-22 DIAGNOSIS — R10.2 PELVIC PAIN: ICD-10-CM

## 2021-10-22 DIAGNOSIS — Z87.42 HISTORY OF OVARIAN CYST: ICD-10-CM

## 2021-10-22 PROCEDURE — 76856 US EXAM PELVIC COMPLETE: CPT | Performed by: OBSTETRICS & GYNECOLOGY

## 2021-10-22 PROCEDURE — 76830 TRANSVAGINAL US NON-OB: CPT | Performed by: OBSTETRICS & GYNECOLOGY

## 2021-10-26 ENCOUNTER — OFFICE VISIT (OUTPATIENT)
Dept: PHYSICAL THERAPY | Facility: HOSPITAL | Age: 36
End: 2021-10-26
Attending: INTERNAL MEDICINE
Payer: COMMERCIAL

## 2021-10-26 ENCOUNTER — LAB ENCOUNTER (OUTPATIENT)
Dept: LAB | Facility: HOSPITAL | Age: 36
End: 2021-10-26
Attending: PREVENTIVE MEDICINE
Payer: COMMERCIAL

## 2021-10-26 DIAGNOSIS — Z00.00 ROUTINE GENERAL MEDICAL EXAMINATION AT A HEALTH CARE FACILITY: ICD-10-CM

## 2021-10-26 PROCEDURE — 97140 MANUAL THERAPY 1/> REGIONS: CPT | Performed by: PHYSICAL THERAPIST

## 2021-10-26 NOTE — PROGRESS NOTES
Patient Name: Amirah Stanley  YOB: 1985          MRN number:  [id number]  Referring Physician:  Leo Islas    Progress Summary    Pt has attended 6. cancelled visits in Physical Therapy    Dx: Mechanical low back pain (M54.5)  Numbness an lumbar extension with hands against the wall Supine 90/90 with L hamstring and R arm reach    R SL, L adductor pull back    R SL, L glut med Lumbar extension with belt OP - for HEP    Side glides R      Bird Dog  1. Arm only  2. Legs 1. 2x15  2. 10x 1.  2x2 stand for 30 mins with 2/10 pain or less to improve household ADL performance. PROGRESSING 10/26/2021  3. Sarath Marshall will report she is able to walk for 30 mins with 2/10 pain or less to improve community ADL performance.  PROGRESSING 10/26/2021

## 2021-10-28 ENCOUNTER — APPOINTMENT (OUTPATIENT)
Dept: PHYSICAL THERAPY | Facility: HOSPITAL | Age: 36
End: 2021-10-28
Attending: INTERNAL MEDICINE
Payer: COMMERCIAL

## 2021-11-02 ENCOUNTER — APPOINTMENT (OUTPATIENT)
Dept: PHYSICAL THERAPY | Facility: HOSPITAL | Age: 36
End: 2021-11-02
Attending: INTERNAL MEDICINE
Payer: COMMERCIAL

## 2021-11-02 ENCOUNTER — LAB ENCOUNTER (OUTPATIENT)
Dept: LAB | Facility: HOSPITAL | Age: 36
End: 2021-11-02
Attending: PREVENTIVE MEDICINE
Payer: COMMERCIAL

## 2021-11-02 DIAGNOSIS — Z00.00 ROUTINE GENERAL MEDICAL EXAMINATION AT A HEALTH CARE FACILITY: ICD-10-CM

## 2021-11-04 ENCOUNTER — TELEPHONE (OUTPATIENT)
Dept: NEUROLOGY | Facility: CLINIC | Age: 36
End: 2021-11-04

## 2021-11-04 ENCOUNTER — OFFICE VISIT (OUTPATIENT)
Dept: PHYSICAL MEDICINE AND REHAB | Facility: CLINIC | Age: 36
End: 2021-11-04
Payer: COMMERCIAL

## 2021-11-04 VITALS
HEART RATE: 84 BPM | OXYGEN SATURATION: 95 % | SYSTOLIC BLOOD PRESSURE: 100 MMHG | DIASTOLIC BLOOD PRESSURE: 70 MMHG | WEIGHT: 118 LBS | BODY MASS INDEX: 20.14 KG/M2 | HEIGHT: 64 IN

## 2021-11-04 DIAGNOSIS — M54.59 MECHANICAL LOW BACK PAIN: ICD-10-CM

## 2021-11-04 DIAGNOSIS — M51.16 LUMBAR DISC HERNIATION WITH RADICULOPATHY: ICD-10-CM

## 2021-11-04 DIAGNOSIS — M47.816 FACET SYNDROME, LUMBAR: ICD-10-CM

## 2021-11-04 DIAGNOSIS — M47.816 LUMBAR SPONDYLOSIS: ICD-10-CM

## 2021-11-04 DIAGNOSIS — M54.59 LUMBAR TRIGGER POINT SYNDROME: ICD-10-CM

## 2021-11-04 DIAGNOSIS — M48.061 LUMBAR FORAMINAL STENOSIS: ICD-10-CM

## 2021-11-04 DIAGNOSIS — M51.26 BULGE OF LUMBAR DISC WITHOUT MYELOPATHY: Primary | ICD-10-CM

## 2021-11-04 DIAGNOSIS — M51.37 DDD (DEGENERATIVE DISC DISEASE), LUMBOSACRAL: ICD-10-CM

## 2021-11-04 PROCEDURE — 3074F SYST BP LT 130 MM HG: CPT | Performed by: PHYSICAL MEDICINE & REHABILITATION

## 2021-11-04 PROCEDURE — 3078F DIAST BP <80 MM HG: CPT | Performed by: PHYSICAL MEDICINE & REHABILITATION

## 2021-11-04 PROCEDURE — 99214 OFFICE O/P EST MOD 30 MIN: CPT | Performed by: PHYSICAL MEDICINE & REHABILITATION

## 2021-11-04 PROCEDURE — 3008F BODY MASS INDEX DOCD: CPT | Performed by: PHYSICAL MEDICINE & REHABILITATION

## 2021-11-04 RX ORDER — METHYLPREDNISOLONE 4 MG/1
TABLET ORAL
Qty: 2 EACH | Refills: 0 | Status: SHIPPED | OUTPATIENT
Start: 2021-11-04

## 2021-11-04 NOTE — TELEPHONE ENCOUNTER
[employer] for authorization of approval for MRI SPINE LUMBAR (CPT=72148). Authorization is not required. Transaction ID: [id number]. Transferred call to scheduling for appt.

## 2021-11-05 NOTE — PROGRESS NOTES
[address]  Progress Note    CHIEF COMPLAINT:  Patient presents with:  Low Back Pain: LOV: 8/3/21 Patient f/u on bilateral low back pain that radiates to posterior R knee, occ N/T. Completed PT. Denies rx. 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 All other systems reviewed and are negative. Pertinent positives and negatives noted in the HPI.         PHYSICAL EXAM:   /70   Pulse 84   Ht 64\"   Wt 118 lb (53.5 kg)   LMP 10/16/2021 (Exact Date)   SpO2 95%   BMI 20.25 kg/m²     Body SARS-CoV-2 ([facility]) 10/26/2021 Not Detected  Not Detected Final   EE Lab Encounter on 10/19/2021   Component Date Value Ref Range Status   • SARS-CoV-2 ([facility]) 10/19/2021 Not Detected  Not Detected Final   [facility] Lab Encounter on 10/12/2021   Component Da OTHER: Negative.                     Impression   CONCLUSION: Normal examination.                 Dictated by (CST): Dex Patel MD on 6/02/2020 at 5:13 PM       Finalized by (CST): Dex Patel MD on 6/02/2020 at 5:14 PM            ASSESSMENT AND P [address]

## 2021-11-10 ENCOUNTER — HOSPITAL ENCOUNTER (OUTPATIENT)
Dept: MRI IMAGING | Age: 36
Discharge: HOME | End: 2021-11-10
Attending: PHYSICAL MEDICINE & REHABILITATION
Payer: COMMERCIAL

## 2021-11-10 ENCOUNTER — LAB ENCOUNTER (OUTPATIENT)
Dept: LAB | Facility: HOSPITAL | Age: 36
End: 2021-11-10
Attending: PREVENTIVE MEDICINE
Payer: COMMERCIAL

## 2021-11-10 DIAGNOSIS — M48.061 LUMBAR FORAMINAL STENOSIS: ICD-10-CM

## 2021-11-10 DIAGNOSIS — M51.26 BULGE OF LUMBAR DISC WITHOUT MYELOPATHY: ICD-10-CM

## 2021-11-10 DIAGNOSIS — M51.37 DDD (DEGENERATIVE DISC DISEASE), LUMBOSACRAL: ICD-10-CM

## 2021-11-10 DIAGNOSIS — M54.59 LUMBAR TRIGGER POINT SYNDROME: ICD-10-CM

## 2021-11-10 DIAGNOSIS — M47.816 LUMBAR SPONDYLOSIS: ICD-10-CM

## 2021-11-10 DIAGNOSIS — Z00.00 ROUTINE GENERAL MEDICAL EXAMINATION AT A HEALTH CARE FACILITY: ICD-10-CM

## 2021-11-10 DIAGNOSIS — M54.59 MECHANICAL LOW BACK PAIN: ICD-10-CM

## 2021-11-10 DIAGNOSIS — M47.816 FACET SYNDROME, LUMBAR: ICD-10-CM

## 2021-11-10 DIAGNOSIS — M51.16 LUMBAR DISC HERNIATION WITH RADICULOPATHY: ICD-10-CM

## 2021-11-10 PROCEDURE — 72148 MRI LUMBAR SPINE W/O DYE: CPT | Performed by: PHYSICAL MEDICINE & REHABILITATION

## 2021-11-16 ENCOUNTER — LAB ENCOUNTER (OUTPATIENT)
Dept: LAB | Facility: HOSPITAL | Age: 36
End: 2021-11-16
Attending: PREVENTIVE MEDICINE
Payer: COMMERCIAL

## 2021-11-16 DIAGNOSIS — Z00.00 ROUTINE GENERAL MEDICAL EXAMINATION AT A HEALTH CARE FACILITY: ICD-10-CM

## 2021-11-23 ENCOUNTER — LAB ENCOUNTER (OUTPATIENT)
Dept: LAB | Facility: HOSPITAL | Age: 36
End: 2021-11-23
Attending: PREVENTIVE MEDICINE
Payer: COMMERCIAL

## 2021-11-23 DIAGNOSIS — Z00.00 ROUTINE GENERAL MEDICAL EXAMINATION AT A HEALTH CARE FACILITY: ICD-10-CM

## 2021-11-30 ENCOUNTER — LAB ENCOUNTER (OUTPATIENT)
Dept: LAB | Facility: HOSPITAL | Age: 36
End: 2021-11-30
Attending: PREVENTIVE MEDICINE
Payer: COMMERCIAL

## 2021-11-30 DIAGNOSIS — Z00.00 ROUTINE GENERAL MEDICAL EXAMINATION AT A HEALTH CARE FACILITY: ICD-10-CM

## 2021-12-09 ENCOUNTER — LAB ENCOUNTER (OUTPATIENT)
Dept: LAB | Facility: HOSPITAL | Age: 36
End: 2021-12-09
Attending: PREVENTIVE MEDICINE
Payer: COMMERCIAL

## 2021-12-09 DIAGNOSIS — Z00.00 ROUTINE GENERAL MEDICAL EXAMINATION AT A HEALTH CARE FACILITY: ICD-10-CM

## 2021-12-14 ENCOUNTER — TELEPHONE (OUTPATIENT)
Dept: PHYSICAL MEDICINE AND REHAB | Facility: CLINIC | Age: 36
End: 2021-12-14

## 2021-12-15 ENCOUNTER — LAB ENCOUNTER (OUTPATIENT)
Dept: LAB | Facility: HOSPITAL | Age: 36
End: 2021-12-15
Attending: PREVENTIVE MEDICINE
Payer: COMMERCIAL

## 2021-12-15 DIAGNOSIS — Z00.00 ROUTINE GENERAL MEDICAL EXAMINATION AT A HEALTH CARE FACILITY: ICD-10-CM

## 2021-12-21 ENCOUNTER — LAB ENCOUNTER (OUTPATIENT)
Dept: LAB | Facility: HOSPITAL | Age: 36
End: 2021-12-21
Attending: PREVENTIVE MEDICINE
Payer: COMMERCIAL

## 2021-12-21 DIAGNOSIS — Z00.00 ROUTINE GENERAL MEDICAL EXAMINATION AT A HEALTH CARE FACILITY: ICD-10-CM

## 2021-12-28 ENCOUNTER — LAB ENCOUNTER (OUTPATIENT)
Dept: LAB | Facility: HOSPITAL | Age: 36
End: 2021-12-28
Attending: PREVENTIVE MEDICINE
Payer: COMMERCIAL

## 2021-12-28 DIAGNOSIS — Z00.00 ROUTINE GENERAL MEDICAL EXAMINATION AT A HEALTH CARE FACILITY: ICD-10-CM

## 2021-12-29 NOTE — PROGRESS NOTES
Covid - not detected Kit and letter sent 7/24/2020. Drug level drawn on 7/31/2020. Results received?

## 2021-12-30 ENCOUNTER — TELEMEDICINE (OUTPATIENT)
Dept: PHYSICAL MEDICINE AND REHAB | Facility: CLINIC | Age: 36
End: 2021-12-30
Payer: COMMERCIAL

## 2021-12-30 DIAGNOSIS — M51.16 LUMBAR DISC HERNIATION WITH RADICULOPATHY: ICD-10-CM

## 2021-12-30 DIAGNOSIS — M47.816 LUMBAR SPONDYLOSIS: ICD-10-CM

## 2021-12-30 DIAGNOSIS — M54.59 LUMBAR TRIGGER POINT SYNDROME: ICD-10-CM

## 2021-12-30 DIAGNOSIS — M54.59 MECHANICAL LOW BACK PAIN: ICD-10-CM

## 2021-12-30 DIAGNOSIS — M48.061 LUMBAR FORAMINAL STENOSIS: ICD-10-CM

## 2021-12-30 DIAGNOSIS — M47.816 FACET SYNDROME, LUMBAR: ICD-10-CM

## 2021-12-30 DIAGNOSIS — M51.26 BULGE OF LUMBAR DISC WITHOUT MYELOPATHY: Primary | ICD-10-CM

## 2021-12-30 DIAGNOSIS — M51.37 DDD (DEGENERATIVE DISC DISEASE), LUMBOSACRAL: ICD-10-CM

## 2021-12-30 PROCEDURE — 99213 OFFICE O/P EST LOW 20 MIN: CPT | Performed by: PHYSICAL MEDICINE & REHABILITATION

## 2021-12-30 NOTE — PATIENT INSTRUCTIONS
1) Continue working with chiropractics  2) If symptoms do not improve, then would recommend LEFT SI joint lidocaine injection for diagnostic test. If that is positive, then would recommend CSI vs prolotherapy.    3) Follow up with me if symptoms do not impr

## 2021-12-30 NOTE — PROGRESS NOTES
[address]  Video Visit Progress Note      Telehealth outside of [address] Verbal Consent   I conducted a telehealth visit with Christos Stanley today, 12/30/21, which was completed using two-way right leg along with new left-sided low back pain. Chanda Mackenzie had an MRI of the lumbar spine performed which I independently reviewed and is unremarkable.   She has been working with a chiropractor which has been beneficial for they have been manipulating the S Partners: Male        Birth control/protection: Vasectomy      FAMILY HISTORY:   Family History   Problem Relation Age of Onset   • Lipids Father    • Hypertension Father    • Other (MS) Father    • Cancer Mother         Hodgkins   • Diabetes Maternal G comprehention intact, spontaneous speech intact  Psychiatric: Mood and affect appropriate        Data  [facility] hospital Lab Encounter on 12/28/2021   Component Date Value Ref Range Status   • SARS-CoV-2 (COVID-19) 12/28/2021 Not Detected  Not Detected Final   [facility] hospital Lab E parameters were utilized for visualization of suspected pathology.      FINDINGS:   NUMERATION: For the purposes of this examination, the lowest fully formed disc space is designated as L5-S1.  ALIGNMENT: There is preservation of the expected lumbar lordosi SI joint diagnostic lidocaine only injection depending on what that test demonstrates will be if we proceed with a corticosteroid injection versus a prolotherapy injection.        RTC in as needed  Discharge Instructions were provided as documented in AVS s

## 2022-01-04 ENCOUNTER — LAB ENCOUNTER (OUTPATIENT)
Dept: LAB | Facility: HOSPITAL | Age: 37
End: 2022-01-04
Attending: PREVENTIVE MEDICINE
Payer: COMMERCIAL

## 2022-01-04 DIAGNOSIS — Z00.00 ROUTINE GENERAL MEDICAL EXAMINATION AT A HEALTH CARE FACILITY: ICD-10-CM

## 2022-01-06 LAB — SARS-COV-2 RNA RESP QL NAA+PROBE: NOT DETECTED

## 2022-01-11 ENCOUNTER — LAB ENCOUNTER (OUTPATIENT)
Dept: LAB | Facility: HOSPITAL | Age: 37
End: 2022-01-11
Attending: PREVENTIVE MEDICINE
Payer: COMMERCIAL

## 2022-01-11 DIAGNOSIS — Z00.00 ROUTINE GENERAL MEDICAL EXAMINATION AT A HEALTH CARE FACILITY: ICD-10-CM

## 2022-01-13 LAB — SARS-COV-2 RNA RESP QL NAA+PROBE: NOT DETECTED

## 2022-01-18 ENCOUNTER — LAB ENCOUNTER (OUTPATIENT)
Dept: LAB | Facility: HOSPITAL | Age: 37
End: 2022-01-18
Attending: PREVENTIVE MEDICINE
Payer: COMMERCIAL

## 2022-01-18 DIAGNOSIS — Z00.00 ROUTINE GENERAL MEDICAL EXAMINATION AT A HEALTH CARE FACILITY: ICD-10-CM

## 2022-01-21 LAB — SARS-COV-2 RNA RESP QL NAA+PROBE: NOT DETECTED

## 2022-01-26 ENCOUNTER — LAB ENCOUNTER (OUTPATIENT)
Dept: LAB | Facility: HOSPITAL | Age: 37
End: 2022-01-26
Attending: PREVENTIVE MEDICINE
Payer: COMMERCIAL

## 2022-01-26 DIAGNOSIS — Z00.00 ROUTINE GENERAL MEDICAL EXAMINATION AT A HEALTH CARE FACILITY: ICD-10-CM

## 2022-01-26 LAB — SARS-COV-2 RNA RESP QL NAA+PROBE: NOT DETECTED

## 2022-02-01 ENCOUNTER — LAB ENCOUNTER (OUTPATIENT)
Dept: LAB | Facility: HOSPITAL | Age: 37
End: 2022-02-01
Attending: PREVENTIVE MEDICINE
Payer: COMMERCIAL

## 2022-02-01 DIAGNOSIS — Z00.00 ROUTINE GENERAL MEDICAL EXAMINATION AT A HEALTH CARE FACILITY: ICD-10-CM

## 2022-02-01 LAB — SARS-COV-2 RNA RESP QL NAA+PROBE: NOT DETECTED

## 2022-02-08 ENCOUNTER — LAB ENCOUNTER (OUTPATIENT)
Dept: LAB | Facility: HOSPITAL | Age: 37
End: 2022-02-08
Attending: PREVENTIVE MEDICINE
Payer: COMMERCIAL

## 2022-02-08 DIAGNOSIS — Z00.00 ROUTINE GENERAL MEDICAL EXAMINATION AT A HEALTH CARE FACILITY: ICD-10-CM

## 2022-02-08 LAB — SARS-COV-2 RNA RESP QL NAA+PROBE: NOT DETECTED

## 2022-02-15 ENCOUNTER — LAB ENCOUNTER (OUTPATIENT)
Dept: LAB | Facility: HOSPITAL | Age: 37
End: 2022-02-15
Attending: PREVENTIVE MEDICINE
Payer: COMMERCIAL

## 2022-02-15 DIAGNOSIS — Z00.00 ROUTINE GENERAL MEDICAL EXAMINATION AT A HEALTH CARE FACILITY: ICD-10-CM

## 2022-03-03 ENCOUNTER — LAB ENCOUNTER (OUTPATIENT)
Dept: LAB | Facility: HOSPITAL | Age: 37
End: 2022-03-03
Attending: PREVENTIVE MEDICINE
Payer: COMMERCIAL

## 2022-03-03 DIAGNOSIS — Z00.00 ROUTINE GENERAL MEDICAL EXAMINATION AT A HEALTH CARE FACILITY: ICD-10-CM

## 2022-03-04 LAB — SARS-COV-2 RNA RESP QL NAA+PROBE: NOT DETECTED

## 2022-03-08 ENCOUNTER — LAB ENCOUNTER (OUTPATIENT)
Dept: LAB | Facility: HOSPITAL | Age: 37
End: 2022-03-08
Attending: PREVENTIVE MEDICINE
Payer: COMMERCIAL

## 2022-03-08 DIAGNOSIS — Z00.00 ROUTINE GENERAL MEDICAL EXAMINATION AT A HEALTH CARE FACILITY: ICD-10-CM

## 2022-03-08 LAB — SARS-COV-2 RNA RESP QL NAA+PROBE: NOT DETECTED

## 2022-03-15 ENCOUNTER — LAB ENCOUNTER (OUTPATIENT)
Dept: LAB | Facility: HOSPITAL | Age: 37
End: 2022-03-15
Attending: PREVENTIVE MEDICINE
Payer: COMMERCIAL

## 2022-03-15 DIAGNOSIS — Z00.00 ROUTINE GENERAL MEDICAL EXAMINATION AT A HEALTH CARE FACILITY: ICD-10-CM

## 2022-03-16 LAB — SARS-COV-2 RNA RESP QL NAA+PROBE: NOT DETECTED

## 2022-03-22 ENCOUNTER — LAB ENCOUNTER (OUTPATIENT)
Dept: LAB | Facility: HOSPITAL | Age: 37
End: 2022-03-22
Attending: PREVENTIVE MEDICINE
Payer: COMMERCIAL

## 2022-03-22 DIAGNOSIS — Z00.00 ROUTINE GENERAL MEDICAL EXAMINATION AT A HEALTH CARE FACILITY: ICD-10-CM

## 2022-03-22 LAB — SARS-COV-2 RNA RESP QL NAA+PROBE: NOT DETECTED

## 2022-03-29 ENCOUNTER — LAB ENCOUNTER (OUTPATIENT)
Dept: LAB | Facility: HOSPITAL | Age: 37
End: 2022-03-29
Attending: PREVENTIVE MEDICINE
Payer: COMMERCIAL

## 2022-03-29 DIAGNOSIS — Z00.00 ROUTINE GENERAL MEDICAL EXAMINATION AT A HEALTH CARE FACILITY: ICD-10-CM

## 2022-03-30 LAB — SARS-COV-2 RNA RESP QL NAA+PROBE: NOT DETECTED

## 2022-04-05 ENCOUNTER — OFFICE VISIT (OUTPATIENT)
Dept: INTERNAL MEDICINE CLINIC | Facility: CLINIC | Age: 37
End: 2022-04-05
Payer: COMMERCIAL

## 2022-04-05 VITALS
BODY MASS INDEX: 20.66 KG/M2 | HEART RATE: 85 BPM | WEIGHT: 121 LBS | OXYGEN SATURATION: 98 % | HEIGHT: 64 IN | DIASTOLIC BLOOD PRESSURE: 62 MMHG | SYSTOLIC BLOOD PRESSURE: 112 MMHG

## 2022-04-05 DIAGNOSIS — M79.604 RIGHT LEG PAIN: Primary | ICD-10-CM

## 2022-04-05 PROCEDURE — 99213 OFFICE O/P EST LOW 20 MIN: CPT | Performed by: INTERNAL MEDICINE

## 2022-04-05 PROCEDURE — 3078F DIAST BP <80 MM HG: CPT | Performed by: INTERNAL MEDICINE

## 2022-04-05 PROCEDURE — 3008F BODY MASS INDEX DOCD: CPT | Performed by: INTERNAL MEDICINE

## 2022-04-05 PROCEDURE — 3074F SYST BP LT 130 MM HG: CPT | Performed by: INTERNAL MEDICINE

## 2022-04-06 ENCOUNTER — LAB ENCOUNTER (OUTPATIENT)
Dept: LAB | Facility: HOSPITAL | Age: 37
End: 2022-04-06
Attending: PREVENTIVE MEDICINE
Payer: COMMERCIAL

## 2022-04-06 DIAGNOSIS — Z00.00 ROUTINE GENERAL MEDICAL EXAMINATION AT A HEALTH CARE FACILITY: ICD-10-CM

## 2022-04-07 LAB — SARS-COV-2 RNA RESP QL NAA+PROBE: NOT DETECTED

## 2022-04-12 ENCOUNTER — LAB ENCOUNTER (OUTPATIENT)
Dept: LAB | Facility: HOSPITAL | Age: 37
End: 2022-04-12
Attending: PREVENTIVE MEDICINE
Payer: COMMERCIAL

## 2022-04-12 DIAGNOSIS — Z00.00 ROUTINE GENERAL MEDICAL EXAMINATION AT A HEALTH CARE FACILITY: ICD-10-CM

## 2022-04-12 LAB — SARS-COV-2 RNA RESP QL NAA+PROBE: NOT DETECTED

## 2022-04-13 ENCOUNTER — HOSPITAL ENCOUNTER (OUTPATIENT)
Dept: ULTRASOUND IMAGING | Facility: HOSPITAL | Age: 37
Discharge: HOME | End: 2022-04-13
Attending: INTERNAL MEDICINE
Payer: COMMERCIAL

## 2022-04-13 DIAGNOSIS — M79.604 RIGHT LEG PAIN: ICD-10-CM

## 2022-04-13 PROCEDURE — 93971 EXTREMITY STUDY: CPT | Performed by: INTERNAL MEDICINE

## 2022-04-19 ENCOUNTER — LAB ENCOUNTER (OUTPATIENT)
Dept: LAB | Facility: HOSPITAL | Age: 37
End: 2022-04-19
Attending: PREVENTIVE MEDICINE
Payer: COMMERCIAL

## 2022-04-19 DIAGNOSIS — Z00.00 ROUTINE GENERAL MEDICAL EXAMINATION AT A HEALTH CARE FACILITY: ICD-10-CM

## 2022-04-20 LAB — SARS-COV-2 RNA RESP QL NAA+PROBE: NOT DETECTED

## 2022-04-28 ENCOUNTER — LAB ENCOUNTER (OUTPATIENT)
Dept: LAB | Facility: HOSPITAL | Age: 37
End: 2022-04-28
Attending: PREVENTIVE MEDICINE
Payer: COMMERCIAL

## 2022-04-28 DIAGNOSIS — Z00.00 ROUTINE GENERAL MEDICAL EXAMINATION AT A HEALTH CARE FACILITY: ICD-10-CM

## 2022-04-29 LAB — SARS-COV-2 RNA RESP QL NAA+PROBE: NOT DETECTED

## 2022-05-04 ENCOUNTER — LAB ENCOUNTER (OUTPATIENT)
Dept: LAB | Facility: HOSPITAL | Age: 37
End: 2022-05-04
Attending: PREVENTIVE MEDICINE
Payer: COMMERCIAL

## 2022-05-04 DIAGNOSIS — Z00.00 ROUTINE GENERAL MEDICAL EXAMINATION AT A HEALTH CARE FACILITY: ICD-10-CM

## 2022-05-05 LAB — SARS-COV-2 RNA RESP QL NAA+PROBE: NOT DETECTED

## 2022-05-11 ENCOUNTER — LAB ENCOUNTER (OUTPATIENT)
Dept: LAB | Facility: HOSPITAL | Age: 37
End: 2022-05-11
Attending: PREVENTIVE MEDICINE
Payer: COMMERCIAL

## 2022-05-11 DIAGNOSIS — Z00.00 ROUTINE GENERAL MEDICAL EXAMINATION AT A HEALTH CARE FACILITY: ICD-10-CM

## 2022-05-12 LAB — SARS-COV-2 RNA RESP QL NAA+PROBE: NOT DETECTED

## 2022-05-18 ENCOUNTER — LAB ENCOUNTER (OUTPATIENT)
Dept: LAB | Facility: HOSPITAL | Age: 37
End: 2022-05-18
Attending: PREVENTIVE MEDICINE
Payer: COMMERCIAL

## 2022-05-18 DIAGNOSIS — Z00.00 ROUTINE GENERAL MEDICAL EXAMINATION AT A HEALTH CARE FACILITY: ICD-10-CM

## 2022-05-19 LAB — SARS-COV-2 RNA RESP QL NAA+PROBE: NOT DETECTED

## 2022-05-24 ENCOUNTER — LAB ENCOUNTER (OUTPATIENT)
Dept: LAB | Facility: HOSPITAL | Age: 37
End: 2022-05-24
Attending: PREVENTIVE MEDICINE
Payer: COMMERCIAL

## 2022-05-24 DIAGNOSIS — Z00.00 ROUTINE GENERAL MEDICAL EXAMINATION AT A HEALTH CARE FACILITY: ICD-10-CM

## 2022-05-24 LAB — SARS-COV-2 RNA RESP QL NAA+PROBE: NOT DETECTED

## 2022-06-01 ENCOUNTER — LAB ENCOUNTER (OUTPATIENT)
Dept: LAB | Facility: HOSPITAL | Age: 37
End: 2022-06-01
Attending: PREVENTIVE MEDICINE
Payer: COMMERCIAL

## 2022-06-01 DIAGNOSIS — Z00.00 ROUTINE GENERAL MEDICAL EXAMINATION AT A HEALTH CARE FACILITY: ICD-10-CM

## 2022-06-01 LAB — SARS-COV-2 RNA RESP QL NAA+PROBE: NOT DETECTED

## 2022-06-08 ENCOUNTER — LAB ENCOUNTER (OUTPATIENT)
Dept: LAB | Facility: HOSPITAL | Age: 37
End: 2022-06-08
Attending: PREVENTIVE MEDICINE
Payer: COMMERCIAL

## 2022-06-08 DIAGNOSIS — Z00.00 ROUTINE GENERAL MEDICAL EXAMINATION AT A HEALTH CARE FACILITY: ICD-10-CM

## 2022-06-08 LAB — SARS-COV-2 RNA RESP QL NAA+PROBE: NOT DETECTED

## 2022-06-14 ENCOUNTER — LAB ENCOUNTER (OUTPATIENT)
Dept: LAB | Facility: HOSPITAL | Age: 37
End: 2022-06-14
Attending: PREVENTIVE MEDICINE
Payer: COMMERCIAL

## 2022-06-14 DIAGNOSIS — Z00.00 ROUTINE GENERAL MEDICAL EXAMINATION AT A HEALTH CARE FACILITY: ICD-10-CM

## 2022-06-14 LAB — SARS-COV-2 RNA RESP QL NAA+PROBE: NOT DETECTED

## 2022-06-21 ENCOUNTER — LAB ENCOUNTER (OUTPATIENT)
Dept: LAB | Facility: HOSPITAL | Age: 37
End: 2022-06-21
Attending: PREVENTIVE MEDICINE
Payer: COMMERCIAL

## 2022-06-21 DIAGNOSIS — Z00.00 ROUTINE GENERAL MEDICAL EXAMINATION AT A HEALTH CARE FACILITY: ICD-10-CM

## 2022-06-21 LAB — SARS-COV-2 RNA RESP QL NAA+PROBE: NOT DETECTED

## 2022-06-28 ENCOUNTER — LAB ENCOUNTER (OUTPATIENT)
Dept: LAB | Facility: HOSPITAL | Age: 37
End: 2022-06-28
Attending: PREVENTIVE MEDICINE
Payer: COMMERCIAL

## 2022-06-28 DIAGNOSIS — Z00.00 ROUTINE GENERAL MEDICAL EXAMINATION AT A HEALTH CARE FACILITY: ICD-10-CM

## 2022-06-28 LAB — SARS-COV-2 RNA RESP QL NAA+PROBE: NOT DETECTED

## 2022-07-07 ENCOUNTER — LAB ENCOUNTER (OUTPATIENT)
Dept: LAB | Facility: HOSPITAL | Age: 37
End: 2022-07-07
Attending: PREVENTIVE MEDICINE
Payer: COMMERCIAL

## 2022-07-07 DIAGNOSIS — Z00.00 ROUTINE GENERAL MEDICAL EXAMINATION AT A HEALTH CARE FACILITY: ICD-10-CM

## 2022-07-08 LAB — SARS-COV-2 RNA RESP QL NAA+PROBE: NOT DETECTED

## 2022-07-12 ENCOUNTER — LAB ENCOUNTER (OUTPATIENT)
Dept: LAB | Facility: HOSPITAL | Age: 37
End: 2022-07-12
Attending: PREVENTIVE MEDICINE
Payer: COMMERCIAL

## 2022-07-12 DIAGNOSIS — Z00.00 ROUTINE GENERAL MEDICAL EXAMINATION AT A HEALTH CARE FACILITY: ICD-10-CM

## 2022-07-13 LAB — SARS-COV-2 RNA RESP QL NAA+PROBE: NOT DETECTED

## 2022-07-14 ENCOUNTER — LAB ENCOUNTER (OUTPATIENT)
Dept: LAB | Age: 37
End: 2022-07-14
Attending: INTERNAL MEDICINE
Payer: COMMERCIAL

## 2022-07-14 ENCOUNTER — OFFICE VISIT (OUTPATIENT)
Dept: INTERNAL MEDICINE CLINIC | Facility: CLINIC | Age: 37
End: 2022-07-14
Payer: COMMERCIAL

## 2022-07-14 ENCOUNTER — TELEPHONE (OUTPATIENT)
Dept: OPHTHALMOLOGY | Facility: CLINIC | Age: 37
End: 2022-07-14

## 2022-07-14 VITALS
WEIGHT: 119 LBS | HEART RATE: 78 BPM | SYSTOLIC BLOOD PRESSURE: 100 MMHG | BODY MASS INDEX: 20.32 KG/M2 | HEIGHT: 64 IN | DIASTOLIC BLOOD PRESSURE: 69 MMHG

## 2022-07-14 DIAGNOSIS — D64.9 ANEMIA, UNSPECIFIED TYPE: ICD-10-CM

## 2022-07-14 DIAGNOSIS — F41.8 POSTPARTUM ANXIETY: ICD-10-CM

## 2022-07-14 DIAGNOSIS — K20.0 EOSINOPHILIC ESOPHAGITIS: ICD-10-CM

## 2022-07-14 DIAGNOSIS — R79.0 LOW FERRITIN: ICD-10-CM

## 2022-07-14 DIAGNOSIS — Z00.00 PHYSICAL EXAM, ANNUAL: ICD-10-CM

## 2022-07-14 DIAGNOSIS — E55.9 VITAMIN D DEFICIENCY: ICD-10-CM

## 2022-07-14 DIAGNOSIS — Z00.00 PHYSICAL EXAM, ANNUAL: Primary | ICD-10-CM

## 2022-07-14 LAB
ALBUMIN SERPL-MCNC: 4.1 G/DL (ref 3.4–5)
ALBUMIN/GLOB SERPL: 1.3 {RATIO} (ref 1–2)
ALP LIVER SERPL-CCNC: 39 U/L
ALT SERPL-CCNC: 21 U/L
ANION GAP SERPL CALC-SCNC: 8 MMOL/L (ref 0–18)
AST SERPL-CCNC: 19 U/L (ref 15–37)
BILIRUB SERPL-MCNC: 0.8 MG/DL (ref 0.1–2)
BUN BLD-MCNC: 14 MG/DL (ref 7–18)
BUN/CREAT SERPL: 16.3 (ref 10–20)
CALCIUM BLD-MCNC: 9.2 MG/DL (ref 8.5–10.1)
CHLORIDE SERPL-SCNC: 105 MMOL/L (ref 98–112)
CHOLEST SERPL-MCNC: 148 MG/DL (ref ?–200)
CO2 SERPL-SCNC: 26 MMOL/L (ref 21–32)
CREAT BLD-MCNC: 0.86 MG/DL
DEPRECATED HBV CORE AB SER IA-ACNC: 14.7 NG/ML
DEPRECATED RDW RBC AUTO: 40.4 FL (ref 35.1–46.3)
ERYTHROCYTE [DISTWIDTH] IN BLOOD BY AUTOMATED COUNT: 13.6 % (ref 11–15)
FASTING PATIENT LIPID ANSWER: YES
FASTING STATUS PATIENT QL REPORTED: YES
FOLATE SERPL-MCNC: 18.5 NG/ML (ref 8.7–?)
GLOBULIN PLAS-MCNC: 3.1 G/DL (ref 2.8–4.4)
GLUCOSE BLD-MCNC: 88 MG/DL (ref 70–99)
HCT VFR BLD AUTO: 42 %
HDLC SERPL-MCNC: 64 MG/DL (ref 40–59)
HGB BLD-MCNC: 13 G/DL
IRON SATN MFR SERPL: 46 %
IRON SERPL-MCNC: 214 UG/DL
LDLC SERPL CALC-MCNC: 69 MG/DL (ref ?–100)
MCH RBC QN AUTO: 25.3 PG (ref 26–34)
MCHC RBC AUTO-ENTMCNC: 31 G/DL (ref 31–37)
MCV RBC AUTO: 81.9 FL
NONHDLC SERPL-MCNC: 84 MG/DL (ref ?–130)
OSMOLALITY SERPL CALC.SUM OF ELEC: 288 MOSM/KG (ref 275–295)
PLATELET # BLD AUTO: 278 10(3)UL (ref 150–450)
POTASSIUM SERPL-SCNC: 4.1 MMOL/L (ref 3.5–5.1)
PROT SERPL-MCNC: 7.2 G/DL (ref 6.4–8.2)
RBC # BLD AUTO: 5.13 X10(6)UL
SODIUM SERPL-SCNC: 139 MMOL/L (ref 136–145)
TIBC SERPL-MCNC: 469 UG/DL (ref 240–450)
TRANSFERRIN SERPL-MCNC: 315 MG/DL (ref 200–360)
TRIGL SERPL-MCNC: 78 MG/DL (ref 30–149)
TSI SER-ACNC: 1.1 MIU/ML (ref 0.36–3.74)
VIT B12 SERPL-MCNC: 340 PG/ML (ref 193–986)
VIT D+METAB SERPL-MCNC: 46.4 NG/ML (ref 30–100)
VLDLC SERPL CALC-MCNC: 12 MG/DL (ref 0–30)
WBC # BLD AUTO: 6.3 X10(3) UL (ref 4–11)

## 2022-07-14 PROCEDURE — 84466 ASSAY OF TRANSFERRIN: CPT

## 2022-07-14 PROCEDURE — 80061 LIPID PANEL: CPT

## 2022-07-14 PROCEDURE — 82306 VITAMIN D 25 HYDROXY: CPT

## 2022-07-14 PROCEDURE — 84443 ASSAY THYROID STIM HORMONE: CPT

## 2022-07-14 PROCEDURE — 3008F BODY MASS INDEX DOCD: CPT | Performed by: INTERNAL MEDICINE

## 2022-07-14 PROCEDURE — 36415 COLL VENOUS BLD VENIPUNCTURE: CPT

## 2022-07-14 PROCEDURE — 3078F DIAST BP <80 MM HG: CPT | Performed by: INTERNAL MEDICINE

## 2022-07-14 PROCEDURE — 3074F SYST BP LT 130 MM HG: CPT | Performed by: INTERNAL MEDICINE

## 2022-07-14 PROCEDURE — 85027 COMPLETE CBC AUTOMATED: CPT

## 2022-07-14 PROCEDURE — 83540 ASSAY OF IRON: CPT

## 2022-07-14 PROCEDURE — 82728 ASSAY OF FERRITIN: CPT

## 2022-07-14 PROCEDURE — 80053 COMPREHEN METABOLIC PANEL: CPT

## 2022-07-14 PROCEDURE — 99385 PREV VISIT NEW AGE 18-39: CPT | Performed by: INTERNAL MEDICINE

## 2022-07-14 PROCEDURE — 82607 VITAMIN B-12: CPT

## 2022-07-14 PROCEDURE — 82746 ASSAY OF FOLIC ACID SERUM: CPT

## 2022-07-14 RX ORDER — OMEPRAZOLE 10 MG/1
10 CAPSULE, DELAYED RELEASE ORAL DAILY
Qty: 90 CAPSULE | Refills: 0 | Status: SHIPPED | OUTPATIENT
Start: 2022-07-14

## 2022-07-14 NOTE — TELEPHONE ENCOUNTER
Spoke to pt and pt states she woke up this morning with some redness and a bit of blurry vision in the left eye and a bit of discharge this morning. Pt mentioned she has a history of iritis that happened years ago and is concerned it may be coming back. Told pt ALFREDO was not back in the office until Tuesday but could offer [place] [id number] Ophthalmology but pt wanted to wait. Scheduled pt on 7/19/22 @ 3:15pm with ALFREDO and told her to call us back if the symptoms got worse before her appt.

## 2022-07-14 NOTE — TELEPHONE ENCOUNTER
Per pt woke up with left eye red today, has history of iritis, asking for appt soon. Please call thank you.

## 2022-07-19 ENCOUNTER — LAB ENCOUNTER (OUTPATIENT)
Dept: LAB | Facility: HOSPITAL | Age: 37
End: 2022-07-19
Attending: PREVENTIVE MEDICINE
Payer: COMMERCIAL

## 2022-07-19 DIAGNOSIS — Z00.00 ROUTINE GENERAL MEDICAL EXAMINATION AT A HEALTH CARE FACILITY: ICD-10-CM

## 2022-07-20 LAB — SARS-COV-2 RNA RESP QL NAA+PROBE: NOT DETECTED

## 2022-07-26 ENCOUNTER — LAB ENCOUNTER (OUTPATIENT)
Dept: LAB | Facility: HOSPITAL | Age: 37
End: 2022-07-26
Attending: PREVENTIVE MEDICINE
Payer: COMMERCIAL

## 2022-07-26 ENCOUNTER — TELEPHONE (OUTPATIENT)
Dept: INTERNAL MEDICINE CLINIC | Facility: CLINIC | Age: 37
End: 2022-07-26

## 2022-07-26 DIAGNOSIS — Z00.00 ROUTINE GENERAL MEDICAL EXAMINATION AT A HEALTH CARE FACILITY: ICD-10-CM

## 2022-07-26 NOTE — TELEPHONE ENCOUNTER
Left message that Health Provider Screening Form Completed with Lab results and faxed to [phone] today. Scanned into chart.

## 2022-07-27 LAB — SARS-COV-2 RNA RESP QL NAA+PROBE: NOT DETECTED

## 2022-08-04 ENCOUNTER — LAB ENCOUNTER (OUTPATIENT)
Dept: LAB | Facility: HOSPITAL | Age: 37
End: 2022-08-04
Attending: PREVENTIVE MEDICINE
Payer: COMMERCIAL

## 2022-08-04 DIAGNOSIS — Z00.00 ROUTINE GENERAL MEDICAL EXAMINATION AT A HEALTH CARE FACILITY: ICD-10-CM

## 2022-08-05 LAB — SARS-COV-2 RNA RESP QL NAA+PROBE: NOT DETECTED

## 2022-08-09 ENCOUNTER — LAB ENCOUNTER (OUTPATIENT)
Dept: LAB | Facility: HOSPITAL | Age: 37
End: 2022-08-09
Attending: PREVENTIVE MEDICINE
Payer: COMMERCIAL

## 2022-08-09 DIAGNOSIS — Z00.00 ROUTINE GENERAL MEDICAL EXAMINATION AT A HEALTH CARE FACILITY: ICD-10-CM

## 2022-08-10 LAB — SARS-COV-2 RNA RESP QL NAA+PROBE: NOT DETECTED

## 2022-08-18 ENCOUNTER — LAB ENCOUNTER (OUTPATIENT)
Dept: LAB | Facility: HOSPITAL | Age: 37
End: 2022-08-18
Attending: PREVENTIVE MEDICINE
Payer: COMMERCIAL

## 2022-08-18 DIAGNOSIS — Z00.00 ROUTINE GENERAL MEDICAL EXAMINATION AT A HEALTH CARE FACILITY: ICD-10-CM

## 2022-08-19 LAB — SARS-COV-2 RNA RESP QL NAA+PROBE: NOT DETECTED

## 2022-08-25 ENCOUNTER — LAB ENCOUNTER (OUTPATIENT)
Dept: LAB | Facility: HOSPITAL | Age: 37
End: 2022-08-25
Attending: PREVENTIVE MEDICINE
Payer: COMMERCIAL

## 2022-08-25 DIAGNOSIS — Z00.00 ROUTINE GENERAL MEDICAL EXAMINATION AT A HEALTH CARE FACILITY: ICD-10-CM

## 2022-08-25 LAB — SARS-COV-2 RNA RESP QL NAA+PROBE: NOT DETECTED

## 2022-09-01 ENCOUNTER — LAB ENCOUNTER (OUTPATIENT)
Dept: LAB | Facility: HOSPITAL | Age: 37
End: 2022-09-01
Attending: PREVENTIVE MEDICINE
Payer: COMMERCIAL

## 2022-09-01 DIAGNOSIS — Z00.00 ROUTINE GENERAL MEDICAL EXAMINATION AT A HEALTH CARE FACILITY: ICD-10-CM

## 2022-09-01 LAB — SARS-COV-2 RNA RESP QL NAA+PROBE: NOT DETECTED

## 2022-09-08 ENCOUNTER — LAB ENCOUNTER (OUTPATIENT)
Dept: LAB | Facility: HOSPITAL | Age: 37
End: 2022-09-08
Attending: PREVENTIVE MEDICINE
Payer: COMMERCIAL

## 2022-09-08 DIAGNOSIS — Z00.00 ROUTINE GENERAL MEDICAL EXAMINATION AT A HEALTH CARE FACILITY: ICD-10-CM

## 2022-09-09 LAB — SARS-COV-2 RNA RESP QL NAA+PROBE: NOT DETECTED

## 2022-09-14 ENCOUNTER — LAB ENCOUNTER (OUTPATIENT)
Dept: LAB | Facility: HOSPITAL | Age: 37
End: 2022-09-14
Attending: PREVENTIVE MEDICINE
Payer: COMMERCIAL

## 2022-09-14 DIAGNOSIS — Z00.00 ROUTINE GENERAL MEDICAL EXAMINATION AT A HEALTH CARE FACILITY: ICD-10-CM

## 2022-09-15 LAB — SARS-COV-2 RNA RESP QL NAA+PROBE: NOT DETECTED

## 2022-09-22 ENCOUNTER — LAB ENCOUNTER (OUTPATIENT)
Dept: LAB | Facility: HOSPITAL | Age: 37
End: 2022-09-22
Attending: PREVENTIVE MEDICINE

## 2022-09-22 DIAGNOSIS — Z00.00 ROUTINE GENERAL MEDICAL EXAMINATION AT A HEALTH CARE FACILITY: ICD-10-CM

## 2022-09-23 LAB — SARS-COV-2 RNA RESP QL NAA+PROBE: NOT DETECTED

## 2022-09-29 ENCOUNTER — LAB ENCOUNTER (OUTPATIENT)
Dept: LAB | Facility: HOSPITAL | Age: 37
End: 2022-09-29
Attending: PREVENTIVE MEDICINE
Payer: COMMERCIAL

## 2022-09-29 DIAGNOSIS — Z00.00 ROUTINE GENERAL MEDICAL EXAMINATION AT A HEALTH CARE FACILITY: ICD-10-CM

## 2022-09-29 LAB — SARS-COV-2 RNA RESP QL NAA+PROBE: NOT DETECTED

## 2022-10-08 DIAGNOSIS — K20.0 EOSINOPHILIC ESOPHAGITIS: ICD-10-CM

## 2022-10-09 RX ORDER — OMEPRAZOLE 10 MG/1
10 CAPSULE, DELAYED RELEASE ORAL DAILY
Qty: 90 CAPSULE | Refills: 1 | Status: SHIPPED | OUTPATIENT
Start: 2022-10-09 | End: 2023-04-25

## 2022-10-09 NOTE — TELEPHONE ENCOUNTER
[facility].   Requested Prescriptions   Pending Prescriptions Disp Refills    OMEPRAZOLE 10 MG Oral Capsule Delayed Release [Pharmacy Med Name: OMEPRAZOLE DR 10 MG CAPSULE] 90 capsule 0     Sig: TAKE 1 CAPSULE BY MOUTH EVERY DAY        Gastrointestional Medication Protocol Passed - 10/8/2022  2:30 PM        Passed - In person appointment or virtual visit in the past 12 mos or appointment in next 3 mos       Recent Outpatient Visits              2 months ago Physical exam, annual    [facility], [address] Doll MD    Office Visit    6 months ago Right leg pain    [facility], Ankita Mullins MD    Office Visit    8 months ago Elective procedure for unacceptable cosmetic appearance    Dermatology - Germania Geronimo MD    Office Visit    9 months ago Bulge of lumbar disc without myelopathy    [address] [facility] Eva Murphy MD    Telemedicine    11 months ago Bulge of lumbar disc without myelopathy    [facility]    Office Visit     Future Appointments         Provider Department Appt Notes    In 1 month Scar Childers [address]. Outpatient Visits              2 months ago Physical exam, annual    [facility], [address] MD    Office Visit    6 months ago Right leg pain    [place] Najera, Ankita Mullins MD    Office Visit    8 months ago Elective procedure for unacceptable cosmetic appearance    Dermatology - Sudhir White MD    Office Visit    9 months ago Bulge of lumbar disc without myelopathy    [address] [facility] Eva Murphy MD    Telemedicine 11 months ago Bulge of lumbar disc without myelopathy    [facility]    Office Visit           Future Appointments         Provider Department Appt Notes    In 1 month Torri Weeks

## 2022-10-12 ENCOUNTER — LAB ENCOUNTER (OUTPATIENT)
Dept: LAB | Facility: HOSPITAL | Age: 37
End: 2022-10-12
Attending: PREVENTIVE MEDICINE
Payer: COMMERCIAL

## 2022-10-12 DIAGNOSIS — Z00.00 ROUTINE GENERAL MEDICAL EXAMINATION AT A HEALTH CARE FACILITY: Primary | ICD-10-CM

## 2022-10-12 DIAGNOSIS — Z00.00 ROUTINE GENERAL MEDICAL EXAMINATION AT A HEALTH CARE FACILITY: ICD-10-CM

## 2022-10-14 LAB — SARS-COV-2 RNA RESP QL NAA+PROBE: NOT DETECTED

## 2022-10-18 ENCOUNTER — LAB ENCOUNTER (OUTPATIENT)
Dept: LAB | Facility: HOSPITAL | Age: 37
End: 2022-10-18
Attending: PREVENTIVE MEDICINE
Payer: COMMERCIAL

## 2022-10-19 LAB — SARS-COV-2 RNA RESP QL NAA+PROBE: NOT DETECTED

## 2022-11-16 ENCOUNTER — APPOINTMENT (OUTPATIENT)
Dept: LAB | Facility: HOSPITAL | Age: 37
End: 2022-11-16
Attending: PREVENTIVE MEDICINE
Payer: COMMERCIAL

## 2022-11-29 ENCOUNTER — IMMUNIZATION (OUTPATIENT)
Dept: LAB | Facility: HOSPITAL | Age: 37
End: 2022-11-29
Attending: PREVENTIVE MEDICINE
Payer: COMMERCIAL

## 2022-11-29 DIAGNOSIS — Z23 NEED FOR VACCINATION: Primary | ICD-10-CM

## 2022-11-29 PROCEDURE — 90471 IMMUNIZATION ADMIN: CPT

## 2022-12-07 ENCOUNTER — OFFICE VISIT (OUTPATIENT)
Dept: INTERNAL MEDICINE CLINIC | Facility: CLINIC | Age: 37
End: 2022-12-07
Payer: COMMERCIAL

## 2022-12-07 VITALS
SYSTOLIC BLOOD PRESSURE: 105 MMHG | BODY MASS INDEX: 21 KG/M2 | WEIGHT: 123 LBS | HEART RATE: 77 BPM | RESPIRATION RATE: 18 BRPM | HEIGHT: 64 IN | DIASTOLIC BLOOD PRESSURE: 69 MMHG

## 2022-12-07 DIAGNOSIS — R00.2 PALPITATIONS: Primary | ICD-10-CM

## 2022-12-07 PROCEDURE — 99213 OFFICE O/P EST LOW 20 MIN: CPT | Performed by: INTERNAL MEDICINE

## 2022-12-07 PROCEDURE — 3008F BODY MASS INDEX DOCD: CPT | Performed by: INTERNAL MEDICINE

## 2022-12-07 PROCEDURE — 3078F DIAST BP <80 MM HG: CPT | Performed by: INTERNAL MEDICINE

## 2022-12-07 PROCEDURE — 3074F SYST BP LT 130 MM HG: CPT | Performed by: INTERNAL MEDICINE

## 2023-03-14 ENCOUNTER — OFFICE VISIT (OUTPATIENT)
Dept: ALLERGY | Facility: CLINIC | Age: 38
End: 2023-03-14

## 2023-03-14 ENCOUNTER — NURSE ONLY (OUTPATIENT)
Dept: ALLERGY | Facility: CLINIC | Age: 38
End: 2023-03-14

## 2023-03-14 VITALS
TEMPERATURE: 99 F | BODY MASS INDEX: 21.52 KG/M2 | HEART RATE: 74 BPM | RESPIRATION RATE: 20 BRPM | WEIGHT: 123 LBS | HEIGHT: 63.5 IN | OXYGEN SATURATION: 100 % | DIASTOLIC BLOOD PRESSURE: 75 MMHG | SYSTOLIC BLOOD PRESSURE: 98 MMHG

## 2023-03-14 DIAGNOSIS — K20.0 EOSINOPHILIC ESOPHAGITIS: Primary | ICD-10-CM

## 2023-03-14 DIAGNOSIS — Z91.018 FOOD ALLERGY: ICD-10-CM

## 2023-03-14 DIAGNOSIS — J30.89 ENVIRONMENTAL AND SEASONAL ALLERGIES: ICD-10-CM

## 2023-03-14 DIAGNOSIS — Z92.29 COVID-19 VACCINE SERIES COMPLETED: ICD-10-CM

## 2023-03-14 DIAGNOSIS — K90.49 FOOD INTOLERANCE: ICD-10-CM

## 2023-03-14 DIAGNOSIS — H01.119 EYELID DERMATITIS, ALLERGIC/CONTACT: ICD-10-CM

## 2023-03-14 PROCEDURE — 3074F SYST BP LT 130 MM HG: CPT | Performed by: ALLERGY & IMMUNOLOGY

## 2023-03-14 PROCEDURE — 3008F BODY MASS INDEX DOCD: CPT | Performed by: ALLERGY & IMMUNOLOGY

## 2023-03-14 PROCEDURE — 99204 OFFICE O/P NEW MOD 45 MIN: CPT | Performed by: ALLERGY & IMMUNOLOGY

## 2023-03-14 PROCEDURE — 95004 PERQ TESTS W/ALRGNC XTRCS: CPT | Performed by: ALLERGY & IMMUNOLOGY

## 2023-03-14 PROCEDURE — 3078F DIAST BP <80 MM HG: CPT | Performed by: ALLERGY & IMMUNOLOGY

## 2023-03-14 RX ORDER — GARLIC EXTRACT 500 MG
1 CAPSULE ORAL DAILY
COMMUNITY

## 2023-03-14 RX ORDER — CHOLECALCIFEROL (VITAMIN D3) 125 MCG
5000 CAPSULE ORAL DAILY
COMMUNITY

## 2023-03-14 NOTE — PATIENT INSTRUCTIONS
#1 eosinophilic esophagitis  Last EGD was 8 years ago. Last GI evaluation was 5 years ago. Patient has upcoming appointment with GI in the near future. Intermittent episodes of dysphagia. No history of food impaction. No ED visits. Recurrence of episodes 4-6  times per year  Currently on proton pump inhibitor. Prior swallowed Flovent in the past but compliance was an issue due to the taste  No prior swallowed budesonide/Pulmicort. No prior Dupixent    Handouts on eosinophilic esophagitis provided and reviewed including common triggers being reflux from below and allergens for above including food and environmental allergens  See above skin testing to foods and environmental allergens to screen for allergic triggers  Reviewed potential not IgE mediated component with food allergies and EOE including with milk and wheat as the biggest trigger followed by eggs and soy followed by nuts and seafood  Follow-up with GI as scheduled. If symptoms persist may consider trial of swallowed budesonide or Dupixent for a potential trial avoidance of milk and wheat empirically    #2 eyelid dermatitis  Better with Shamir from her dermatologist.  Patient looking for allergic triggers. See above skin testing to environmental allergens  We also consider hydrocortisone 1% or 2.5% over-the-counter if needed  Reviewed potential patch testing if worsening to screen for allergic contact dermatitis  Cool compresses as needed  Glennie [place] is a soap, Vanicream as a moisturizer    #3 Food allergy  See above skin testing to common food allergens given history of eczematous dermatitis as well as eosinophilic esophagitis  Recommend to avoid any foods that are positive on skin testing. No history of acute reactions within 2 hours of loss of eating any particular food    #4 COVID vaccinations up-to-date    #5 flu vaccine up-to-date         Orders This Visit:  No orders of the defined types were placed in this encounter.

## 2023-03-22 ENCOUNTER — OFFICE VISIT (OUTPATIENT)
Dept: GASTROENTEROLOGY | Facility: CLINIC | Age: 38
End: 2023-03-22

## 2023-03-22 ENCOUNTER — TELEPHONE (OUTPATIENT)
Dept: GASTROENTEROLOGY | Facility: CLINIC | Age: 38
End: 2023-03-22

## 2023-03-22 VITALS
SYSTOLIC BLOOD PRESSURE: 96 MMHG | DIASTOLIC BLOOD PRESSURE: 66 MMHG | HEIGHT: 63.5 IN | BODY MASS INDEX: 21.59 KG/M2 | WEIGHT: 123.38 LBS | HEART RATE: 74 BPM

## 2023-03-22 DIAGNOSIS — K20.0 EOSINOPHILIC ESOPHAGITIS: Primary | ICD-10-CM

## 2023-03-22 DIAGNOSIS — R10.11 RUQ ABDOMINAL PAIN: ICD-10-CM

## 2023-03-22 DIAGNOSIS — R10.11 RUQ PAIN: ICD-10-CM

## 2023-03-22 PROCEDURE — 3008F BODY MASS INDEX DOCD: CPT | Performed by: INTERNAL MEDICINE

## 2023-03-22 PROCEDURE — 99204 OFFICE O/P NEW MOD 45 MIN: CPT | Performed by: INTERNAL MEDICINE

## 2023-03-22 PROCEDURE — 3074F SYST BP LT 130 MM HG: CPT | Performed by: INTERNAL MEDICINE

## 2023-03-22 PROCEDURE — 3078F DIAST BP <80 MM HG: CPT | Performed by: INTERNAL MEDICINE

## 2023-03-22 NOTE — TELEPHONE ENCOUNTER
Scheduled for:  Maureen Mckinney [address]   Provider Name:    Date:  6/16/2023  Location:   [facility]  Sedation:  Mac   Time:  11:30 Am (pt is aware to arrive at 10:30 Am)   Prep: gd -Npo 3 hrs before prior to procedure Prep instructions were given to pt in the office, I discuss prep Instructions with patient at the time of the appointment which she verbally understood and given the prep instructions at the time of the appointment  Meds/Allergies Reconciled?:  Physician reviewed     Diagnosis with codes:  Right upper quadrant pain R79,52 , Eosinophilic esophagitis S88.3  Was patient informed to call insurance with codes (Y/N):  Yes, I confirmed [employer] [place] insurance with the patient. The patient also verbally understands to call her insurance to check for pre-cert, codes were given on prep instructions. Referral sent?:  Yes,Referral was sent at the time of electronic surgical scheduling. [facility] or [address] notified?:  I sent an electronic request to Endo Scheduling and received a confirmation today. Medication Orders: This patient verbally confirmed that she is not taking:   Iron, blood thinners, BP meds, and is not diabetic   Not latex allergy, Yes,has PCN allergy and does not have a pacemaker Pt is aware to NOT take iron pills, herbal meds and diet supplements for 7 days before exam. Also to NOT take any form of alcohol, recreational drugs and any forms of ED meds 24 hours before exam.    Misc Orders:  Patient was informed that they will need a COVID 19 test prior to their procedure. Patient verbally understood & will await a phone call from [facility] to schedule.       Further instructions given by staff:

## 2023-03-22 NOTE — PATIENT INSTRUCTIONS
EoE/GERD  - continue on omeprazole   - EGD with MAC     RUQ pain  - call to set up ultrasound gallbladder.

## 2023-04-28 ENCOUNTER — OFFICE VISIT (OUTPATIENT)
Dept: SURGERY | Facility: CLINIC | Age: 38
End: 2023-04-28
Payer: COMMERCIAL

## 2023-04-28 VITALS
DIASTOLIC BLOOD PRESSURE: 70 MMHG | WEIGHT: 123.19 LBS | BODY MASS INDEX: 21.56 KG/M2 | HEIGHT: 63.5 IN | SYSTOLIC BLOOD PRESSURE: 108 MMHG

## 2023-04-28 DIAGNOSIS — Z12.31 BREAST CANCER SCREENING BY MAMMOGRAM: ICD-10-CM

## 2023-04-28 DIAGNOSIS — Z12.4 ROUTINE CERVICAL SMEAR: Primary | ICD-10-CM

## 2023-04-28 DIAGNOSIS — Z01.419 WOMEN'S ANNUAL ROUTINE GYNECOLOGICAL EXAMINATION: ICD-10-CM

## 2023-04-28 PROCEDURE — 87624 HPV HI-RISK TYP POOLED RSLT: CPT | Performed by: OBSTETRICS & GYNECOLOGY

## 2023-04-28 PROCEDURE — 99395 PREV VISIT EST AGE 18-39: CPT | Performed by: OBSTETRICS & GYNECOLOGY

## 2023-04-28 PROCEDURE — 3074F SYST BP LT 130 MM HG: CPT | Performed by: OBSTETRICS & GYNECOLOGY

## 2023-04-28 PROCEDURE — 3078F DIAST BP <80 MM HG: CPT | Performed by: OBSTETRICS & GYNECOLOGY

## 2023-04-28 PROCEDURE — 3008F BODY MASS INDEX DOCD: CPT | Performed by: OBSTETRICS & GYNECOLOGY

## 2023-05-01 LAB — HPV I/H RISK 1 DNA SPEC QL NAA+PROBE: NEGATIVE

## 2023-05-17 ENCOUNTER — HOSPITAL ENCOUNTER (OUTPATIENT)
Dept: MAMMOGRAPHY | Age: 38
Discharge: HOME | End: 2023-05-17
Attending: OBSTETRICS & GYNECOLOGY
Payer: COMMERCIAL

## 2023-05-17 ENCOUNTER — PATIENT MESSAGE (OUTPATIENT)
Dept: SURGERY | Facility: CLINIC | Age: 38
End: 2023-05-17

## 2023-05-17 DIAGNOSIS — R92.2 INCONCLUSIVE MAMMOGRAPHY: Primary | ICD-10-CM

## 2023-05-17 DIAGNOSIS — Z12.31 BREAST CANCER SCREENING BY MAMMOGRAM: ICD-10-CM

## 2023-05-17 PROCEDURE — 77063 BREAST TOMOSYNTHESIS BI: CPT | Performed by: OBSTETRICS & GYNECOLOGY

## 2023-05-17 PROCEDURE — 77067 SCR MAMMO BI INCL CAD: CPT | Performed by: OBSTETRICS & GYNECOLOGY

## 2023-05-18 NOTE — TELEPHONE ENCOUNTER
Called Naeem Diez, breast nurse navigator and due to no change after two year f/u of left breast not needed. From: Luz Marina Stanley  To: Jennifer Barnett MD  Sent: 5/17/2023  8:47 PM CDT  Subject: Test Results Question    I have a question about [facility] DMITRI 2D+3D SCREENING BILAT (CPT=77067/48723) resulted on 5/17/23, 12:37 PM.    Hello  Are you going to order an ultrasound on the two existing left breast fibroadenomas?   The mammogram didn't mention anything about them   Thanks  Jordyn Cole

## 2023-05-25 ENCOUNTER — HOSPITAL ENCOUNTER (OUTPATIENT)
Dept: MAMMOGRAPHY | Facility: HOSPITAL | Age: 38
Discharge: HOME | End: 2023-05-25
Attending: OBSTETRICS & GYNECOLOGY
Payer: COMMERCIAL

## 2023-05-25 DIAGNOSIS — R92.2 INCONCLUSIVE MAMMOGRAM: ICD-10-CM

## 2023-05-25 PROCEDURE — 77061 BREAST TOMOSYNTHESIS UNI: CPT | Performed by: OBSTETRICS & GYNECOLOGY

## 2023-05-25 PROCEDURE — 76642 ULTRASOUND BREAST LIMITED: CPT | Performed by: OBSTETRICS & GYNECOLOGY

## 2023-05-25 PROCEDURE — 77065 DX MAMMO INCL CAD UNI: CPT | Performed by: OBSTETRICS & GYNECOLOGY

## 2023-06-08 DIAGNOSIS — R92.2 INCONCLUSIVE MAMMOGRAPHY: Primary | ICD-10-CM

## 2023-06-08 NOTE — PROGRESS NOTES
Your mammogram and right breast USN show likely benign findings requiring short-term follow-up. Please schedule your next right breast USN exam in 6 months.        Jonh Lyons MD

## 2023-06-09 NOTE — PAT NURSING NOTE
Confirmed arrival time and location of [facility] for upcoming EGD with Dr. Francis Nevarez on 06/16/2023.

## 2023-06-16 ENCOUNTER — ANESTHESIA EVENT (OUTPATIENT)
Dept: ENDOSCOPY | Age: 38
End: 2023-06-16
Payer: COMMERCIAL

## 2023-06-16 ENCOUNTER — ANESTHESIA (OUTPATIENT)
Dept: ENDOSCOPY | Age: 38
End: 2023-06-16
Payer: COMMERCIAL

## 2023-06-16 ENCOUNTER — HOSPITAL ENCOUNTER (OUTPATIENT)
Age: 38
Setting detail: HOSPITAL OUTPATIENT SURGERY
Discharge: HOME | End: 2023-06-16
Attending: INTERNAL MEDICINE | Admitting: INTERNAL MEDICINE
Payer: COMMERCIAL

## 2023-06-16 DIAGNOSIS — R10.11 RUQ ABDOMINAL PAIN: ICD-10-CM

## 2023-06-16 DIAGNOSIS — K20.0 EOSINOPHILIC ESOPHAGITIS: ICD-10-CM

## 2023-06-16 LAB — B-HCG UR QL: NEGATIVE

## 2023-06-16 PROCEDURE — 99070 SPECIAL SUPPLIES PHYS/QHP: CPT | Performed by: INTERNAL MEDICINE

## 2023-06-16 PROCEDURE — 88305 TISSUE EXAM BY PATHOLOGIST: CPT | Performed by: INTERNAL MEDICINE

## 2023-06-16 PROCEDURE — 81025 URINE PREGNANCY TEST: CPT

## 2023-06-16 PROCEDURE — 88312 SPECIAL STAINS GROUP 1: CPT | Performed by: INTERNAL MEDICINE

## 2023-06-16 PROCEDURE — 43239 EGD BIOPSY SINGLE/MULTIPLE: CPT | Performed by: INTERNAL MEDICINE

## 2023-06-16 RX ORDER — SODIUM CHLORIDE, SODIUM LACTATE, POTASSIUM CHLORIDE, CALCIUM CHLORIDE 600; 310; 30; 20 MG/100ML; MG/100ML; MG/100ML; MG/100ML
INJECTION, SOLUTION INTRAVENOUS CONTINUOUS
OUTPATIENT
Start: 2023-06-16

## 2023-06-16 RX ORDER — GLYCOPYRROLATE 0.2 MG/ML
INJECTION, SOLUTION INTRAMUSCULAR; INTRAVENOUS AS NEEDED
Status: DISCONTINUED | OUTPATIENT
Start: 2023-06-16 | End: 2023-06-16 | Stop reason: SURG

## 2023-06-16 RX ORDER — SODIUM CHLORIDE, SODIUM LACTATE, POTASSIUM CHLORIDE, CALCIUM CHLORIDE 600; 310; 30; 20 MG/100ML; MG/100ML; MG/100ML; MG/100ML
INJECTION, SOLUTION INTRAVENOUS CONTINUOUS
Status: DISCONTINUED | OUTPATIENT
Start: 2023-06-16 | End: 2023-06-16

## 2023-06-16 RX ORDER — LIDOCAINE HYDROCHLORIDE 20 MG/ML
INJECTION, SOLUTION EPIDURAL; INFILTRATION; INTRACAUDAL; PERINEURAL AS NEEDED
Status: DISCONTINUED | OUTPATIENT
Start: 2023-06-16 | End: 2023-06-16 | Stop reason: SURG

## 2023-06-16 RX ORDER — SODIUM CHLORIDE, SODIUM LACTATE, POTASSIUM CHLORIDE, CALCIUM CHLORIDE 600; 310; 30; 20 MG/100ML; MG/100ML; MG/100ML; MG/100ML
INJECTION, SOLUTION INTRAVENOUS CONTINUOUS PRN
Status: DISCONTINUED | OUTPATIENT
Start: 2023-06-16 | End: 2023-06-16 | Stop reason: SURG

## 2023-06-16 RX ADMIN — LIDOCAINE HYDROCHLORIDE 80 MG: 20 INJECTION, SOLUTION EPIDURAL; INFILTRATION; INTRACAUDAL; PERINEURAL at 11:20:00

## 2023-06-16 RX ADMIN — GLYCOPYRROLATE 0.2 MG: 0.2 INJECTION, SOLUTION INTRAMUSCULAR; INTRAVENOUS at 11:20:00

## 2023-06-16 RX ADMIN — SODIUM CHLORIDE, SODIUM LACTATE, POTASSIUM CHLORIDE, CALCIUM CHLORIDE: 600; 310; 30; 20 INJECTION, SOLUTION INTRAVENOUS at 11:18:00

## 2023-06-16 NOTE — OPERATIVE REPORT
[facility] Endoscopy Report      Preoperative Diagnosis:  - history of EoE  - history of RUQ pain      Postoperative Diagnosis:  - no sign of EoE  - gastric polyps   - normal duodenum      Procedure:    Esophagogastroduodenoscopy      Surgeon:  Bella Cohen M.D. Anesthesia:  MAC     Technique:  After informed consent, the patient was placed in the left lateral recumbent position. An Olympus adult HD gastroscope was inserted into the hypopharynx and advanced under direct vision into the esophagus, stomach and duodenum. The endoscope was withdrawn to the stomach where retroflexion of the annulus, body, cardia and fundus was performed. The instrument was straightened, insufflated air and fluid were suctioned and the endoscope was withdrawn. The procedures was well tolerated without immediate complication. Findings: The esophagus was normal.  No signs of eosinophilic esophagitis, no strictures were noted. Biopsies taken from the distal and midesophagus to look for inflammation/eosinophils. The GE junction and diaphragmatic impression were at 40 cm /no hiatal hernia. The stomach distended appropriately with insufflated air. The mucosa of the stomach including cardia, fundus, gastric body and antrum were normal.  Multiple gastric polyps consistent with fundic gland type polyps were noted throughout the body and fundic regions of the stomach. Some of these were quite large and ranging in size from 5 mm to 1 cm in size. Biopsies taken. The duodenal bulb and post bulbar regions were normal.  Biopsies taken from the second portion of the duodenum. Estimated blood loss-insignificant  Specimens-see above    Impression:  - no sign of EoE  - gastric polyps   - normal duodenum      Recommendations:  - Post procedure instructions given  - Follow up on biopsies  - Continue on low dose PPI for EoE treatment          Antionette Billings MD  6/16/2023  11:30 AM

## 2023-06-16 NOTE — ANESTHESIA POSTPROCEDURE EVALUATION
Patient: Jade Roach Lizeth    Procedure Summary     Date: 06/16/23 Room / Location: [facility] / [address] [facility] ENDO    Anesthesia Start: 1118 Anesthesia Stop: 0576    Procedure: ESOPHAGOGASTRODUODENOSCOPY (EGD) Diagnosis:       Eosinophilic esophagitis      RUQ abdominal pain      (gastric polyp,)    Surgeons: Rosaline Virk MD Anesthesiologist:     Anesthesia Type: MAC ASA Status: 2          Anesthesia Type: MAC    Vitals Value Taken Time   BP 91/68 06/16/23 1132   Temp  06/16/23 1134   Pulse 69 06/16/23 1133   Resp 23 06/16/23 1133   SpO2 100 % 06/16/23 1133   Vitals shown include unvalidated device data.     [address] AN Post Evaluation:   Patient Evaluated in PACU  Patient Participation: waiting for patient participation  Level of Consciousness: sleepy but conscious  Pain Score: 0  Pain Management: adequate  Airway Patency:patent  Dental exam unchanged from preop  Yes    Cardiovascular Status: hemodynamically stable  Respiratory Status: spontaneous ventilation, unassisted, nonlabored ventilation, room air and airway suctioned  Postoperative Hydration stable      Marian Das DO  6/16/2023 11:34 AM

## 2023-06-16 NOTE — DISCHARGE INSTRUCTIONS
Home Care Instructions for Gastroscopy with Sedation    Diet:  - Resume your regular diet as tolerated unless otherwise instructed. - Start with light meals to minimize bloating.  - Do not drink alcohol today. Medication:  - If you have questions about resuming your normal medications, please contact your Primary Care Physician. Activities:  - Take it easy today. Do not return to work today. - Do not drive today. - Do not operate any machinery today (including kitchen equipment). Gastroscopy:  - You may have a sore throat for 2-3 days following the exam. This is normal. Gargling with warm salt water (1/2 tsp salt to 1 glass warm water) or using throat lozenges will help. - If you experience any sharp pain in your neck, abdomen or chest, vomiting of blood, oral temperature over 100 degrees Fahrenheit, light-headedness or dizziness, or any other problems, contact your doctor. **If unable to reach your doctor, please go to the [facility] Emergency Room**    - Your referring physician will receive a full report of your examination.  - If you do not hear from your doctor's office within two weeks of your biopsy, please call them for your results. You may be able to see your laboratory results in [facility] between 4 and 7 business days. In some cases, your physician may not have viewed the results before they are released to [address] If you have questions regarding your results contact the physician who ordered the test/exam by phone or via [address] by choosing \"Ask a Medical Question. \"

## 2023-06-17 VITALS
HEART RATE: 56 BPM | WEIGHT: 118 LBS | RESPIRATION RATE: 15 BRPM | BODY MASS INDEX: 20.14 KG/M2 | SYSTOLIC BLOOD PRESSURE: 92 MMHG | HEIGHT: 64 IN | OXYGEN SATURATION: 100 % | DIASTOLIC BLOOD PRESSURE: 66 MMHG

## 2023-06-26 ENCOUNTER — MED REC SCAN ONLY (OUTPATIENT)
Facility: CLINIC | Age: 38
End: 2023-06-26

## 2023-07-14 ENCOUNTER — PATIENT MESSAGE (OUTPATIENT)
Dept: GASTROENTEROLOGY | Facility: CLINIC | Age: 38
End: 2023-07-14

## 2023-07-14 ENCOUNTER — TELEPHONE (OUTPATIENT)
Facility: CLINIC | Age: 38
End: 2023-07-14

## 2023-07-14 NOTE — TELEPHONE ENCOUNTER
From: Aster Stanley  To: Mary Solis. Misael Ahn MD  Sent: 7/14/2023 2:41 PM CDT  Subject: Visit Follow-up Question    I have a question about SURGICAL PATHOLOGY TISSUE resulted on 6/19/23, 9:40 AM.    Hello  I never heard back about my biopsy from last month.

## 2023-07-14 NOTE — TELEPHONE ENCOUNTER
Luis Martinez MD Physician Signed  4:41 PM     Copy     Patient contacted results of her endoscopy were discussed, the EGD showed no obvious signs of EOE, biopsies taken from the distal esophagus did show some increased eosinophils however biopsies from the midesophagus were largely negative for EOE/only minimal/rare intraepithelial eosinophils. Patient has not gone ahead with the ultrasound, she reports her right-sided pain has resolved. Plan-at this point I do not see any signs of active EOE, likely current eosinophils in the distal esophagus are more related to reflux and will continue the patient on acid reduction therapy with Prilosec 10 mg/day. To call with any flareup in symptoms. To call if she would like to go ahead with the ultrasound.      Nursing staff to please discontinue the ultrasound

## 2023-07-14 NOTE — TELEPHONE ENCOUNTER
Patient contacted results of her endoscopy were discussed, the EGD showed no obvious signs of EOE, biopsies taken from the distal esophagus did show some increased eosinophils however biopsies from the midesophagus were largely negative for EOE/only minimal/rare intraepithelial eosinophils. Patient has not gone ahead with the ultrasound, she reports her right-sided pain has resolved. Plan-at this point I do not see any signs of active EOE, likely current eosinophils in the distal esophagus are more related to reflux and will continue the patient on acid reduction therapy with Prilosec 10 mg/day. To call with any flareup in symptoms. To call if she would like to go ahead with the ultrasound.     Nursing staff to please discontinue the ultrasound

## 2023-07-27 ENCOUNTER — PATIENT MESSAGE (OUTPATIENT)
Dept: SURGERY | Facility: CLINIC | Age: 38
End: 2023-07-27

## 2023-07-27 DIAGNOSIS — N63.20 MASS OF LEFT BREAST, UNSPECIFIED QUADRANT: Primary | ICD-10-CM

## 2023-08-08 ENCOUNTER — HOSPITAL ENCOUNTER (OUTPATIENT)
Dept: MAMMOGRAPHY | Facility: HOSPITAL | Age: 38
Discharge: HOME | End: 2023-08-08
Attending: OBSTETRICS & GYNECOLOGY
Payer: COMMERCIAL

## 2023-08-08 DIAGNOSIS — N63.20 MASS OF LEFT BREAST, UNSPECIFIED QUADRANT: ICD-10-CM

## 2023-08-08 PROCEDURE — 77061 BREAST TOMOSYNTHESIS UNI: CPT | Performed by: OBSTETRICS & GYNECOLOGY

## 2023-08-08 PROCEDURE — 77065 DX MAMMO INCL CAD UNI: CPT | Performed by: OBSTETRICS & GYNECOLOGY

## 2023-08-08 PROCEDURE — 76642 ULTRASOUND BREAST LIMITED: CPT | Performed by: OBSTETRICS & GYNECOLOGY

## 2023-08-29 ENCOUNTER — LAB ENCOUNTER (OUTPATIENT)
Dept: LAB | Age: 38
End: 2023-08-29
Attending: INTERNAL MEDICINE
Payer: COMMERCIAL

## 2023-08-29 ENCOUNTER — OFFICE VISIT (OUTPATIENT)
Dept: INTERNAL MEDICINE CLINIC | Facility: CLINIC | Age: 38
End: 2023-08-29

## 2023-08-29 VITALS
WEIGHT: 120 LBS | HEIGHT: 64 IN | DIASTOLIC BLOOD PRESSURE: 56 MMHG | HEART RATE: 71 BPM | SYSTOLIC BLOOD PRESSURE: 91 MMHG | BODY MASS INDEX: 20.49 KG/M2

## 2023-08-29 DIAGNOSIS — Z00.00 PHYSICAL EXAM, ANNUAL: Primary | ICD-10-CM

## 2023-08-29 DIAGNOSIS — Z00.00 PHYSICAL EXAM, ANNUAL: ICD-10-CM

## 2023-08-29 DIAGNOSIS — Z79.899 CURRENT USE OF PROTON PUMP INHIBITOR: ICD-10-CM

## 2023-08-29 LAB
ALBUMIN SERPL-MCNC: 3.9 G/DL (ref 3.4–5)
ALBUMIN/GLOB SERPL: 1.4 {RATIO} (ref 1–2)
ALP LIVER SERPL-CCNC: 39 U/L
ALT SERPL-CCNC: 15 U/L
ANION GAP SERPL CALC-SCNC: 9 MMOL/L (ref 0–18)
AST SERPL-CCNC: 19 U/L (ref 15–37)
BILIRUB SERPL-MCNC: 0.5 MG/DL (ref 0.1–2)
BUN BLD-MCNC: 12 MG/DL (ref 7–18)
BUN/CREAT SERPL: 14.8 (ref 10–20)
CALCIUM BLD-MCNC: 8.7 MG/DL (ref 8.5–10.1)
CHLORIDE SERPL-SCNC: 107 MMOL/L (ref 98–112)
CHOLEST SERPL-MCNC: 161 MG/DL (ref ?–200)
CO2 SERPL-SCNC: 24 MMOL/L (ref 21–32)
CREAT BLD-MCNC: 0.81 MG/DL
DEPRECATED RDW RBC AUTO: 40.7 FL (ref 35.1–46.3)
EGFRCR SERPLBLD CKD-EPI 2021: 94 ML/MIN/1.73M2 (ref 60–?)
ERYTHROCYTE [DISTWIDTH] IN BLOOD BY AUTOMATED COUNT: 13.9 % (ref 11–15)
FASTING PATIENT LIPID ANSWER: YES
FASTING STATUS PATIENT QL REPORTED: YES
GLOBULIN PLAS-MCNC: 2.8 G/DL (ref 2.8–4.4)
GLUCOSE BLD-MCNC: 90 MG/DL (ref 70–99)
HCT VFR BLD AUTO: 38.8 %
HDLC SERPL-MCNC: 70 MG/DL (ref 40–59)
HGB BLD-MCNC: 12.3 G/DL
LDLC SERPL CALC-MCNC: 80 MG/DL (ref ?–100)
MAGNESIUM SERPL-MCNC: 2.3 MG/DL (ref 1.6–2.6)
MCH RBC QN AUTO: 25.7 PG (ref 26–34)
MCHC RBC AUTO-ENTMCNC: 31.7 G/DL (ref 31–37)
MCV RBC AUTO: 81.2 FL
NONHDLC SERPL-MCNC: 91 MG/DL (ref ?–130)
OSMOLALITY SERPL CALC.SUM OF ELEC: 289 MOSM/KG (ref 275–295)
PLATELET # BLD AUTO: 287 10(3)UL (ref 150–450)
POTASSIUM SERPL-SCNC: 3.8 MMOL/L (ref 3.5–5.1)
PROT SERPL-MCNC: 6.7 G/DL (ref 6.4–8.2)
RBC # BLD AUTO: 4.78 X10(6)UL
SODIUM SERPL-SCNC: 140 MMOL/L (ref 136–145)
TRIGL SERPL-MCNC: 56 MG/DL (ref 30–149)
TSI SER-ACNC: 1.43 MIU/ML (ref 0.36–3.74)
VIT B12 SERPL-MCNC: 429 PG/ML (ref 193–986)
VIT D+METAB SERPL-MCNC: 69 NG/ML (ref 30–100)
VLDLC SERPL CALC-MCNC: 9 MG/DL (ref 0–30)
WBC # BLD AUTO: 6.5 X10(3) UL (ref 4–11)

## 2023-08-29 PROCEDURE — 3078F DIAST BP <80 MM HG: CPT | Performed by: INTERNAL MEDICINE

## 2023-08-29 PROCEDURE — 82306 VITAMIN D 25 HYDROXY: CPT

## 2023-08-29 PROCEDURE — 80053 COMPREHEN METABOLIC PANEL: CPT

## 2023-08-29 PROCEDURE — 84443 ASSAY THYROID STIM HORMONE: CPT

## 2023-08-29 PROCEDURE — 80061 LIPID PANEL: CPT

## 2023-08-29 PROCEDURE — 82607 VITAMIN B-12: CPT

## 2023-08-29 PROCEDURE — 85027 COMPLETE CBC AUTOMATED: CPT

## 2023-08-29 PROCEDURE — 36415 COLL VENOUS BLD VENIPUNCTURE: CPT

## 2023-08-29 PROCEDURE — 83735 ASSAY OF MAGNESIUM: CPT

## 2023-08-29 PROCEDURE — 3008F BODY MASS INDEX DOCD: CPT | Performed by: INTERNAL MEDICINE

## 2023-08-29 PROCEDURE — 3074F SYST BP LT 130 MM HG: CPT | Performed by: INTERNAL MEDICINE

## 2023-08-29 PROCEDURE — 99396 PREV VISIT EST AGE 40-64: CPT | Performed by: INTERNAL MEDICINE

## 2023-09-05 ENCOUNTER — PATIENT MESSAGE (OUTPATIENT)
Dept: INTERNAL MEDICINE CLINIC | Facility: CLINIC | Age: 38
End: 2023-09-05

## 2023-09-05 NOTE — TELEPHONE ENCOUNTER
Dr. Bhaskar Leigh staff, please see attached form. Thanks.     Last visit: 8/29/23    Future Appointments   Date Time Provider Teresa Louie   12/18/2023  1:00 PM DENICE Bañuelos [facility]

## 2023-09-05 NOTE — TELEPHONE ENCOUNTER
From: Mary Ann Stanley  To: Jolynn Manning MD  Sent: 9/5/2023 10:37 AM CDT  Subject: Physical Form    Hello  Attached is my physical form. I had my appointment last week with Dr. Radha Persaud. Can someone please complete it and fax it in?  Thank you!!!    Mary Ann Qiu

## 2023-09-05 NOTE — TELEPHONE ENCOUNTER
Patient informed that form requires her signature. She says she will fax another copy of form already signed.

## 2023-09-07 NOTE — TELEPHONE ENCOUNTER
Form has not been received since last message on 9/5/2023     Form currently signed by  in purple physical folder, NEEDS PTS SIGNATURE, will wait if other form with signature of pts

## 2023-09-22 ENCOUNTER — MED REC SCAN ONLY (OUTPATIENT)
Dept: INTERNAL MEDICINE CLINIC | Facility: CLINIC | Age: 38
End: 2023-09-22

## 2023-10-25 ENCOUNTER — EKG ENCOUNTER (OUTPATIENT)
Dept: LAB | Age: 38
End: 2023-10-25
Attending: INTERNAL MEDICINE

## 2023-10-25 DIAGNOSIS — R00.2 PALPITATIONS: ICD-10-CM

## 2023-10-25 PROCEDURE — 93010 ELECTROCARDIOGRAM REPORT: CPT | Performed by: INTERNAL MEDICINE

## 2023-10-25 PROCEDURE — 93005 ELECTROCARDIOGRAM TRACING: CPT

## 2023-10-26 LAB
ATRIAL RATE: 63 BPM
P AXIS: 67 DEGREES
P-R INTERVAL: 164 MS
Q-T INTERVAL: 422 MS
QRS DURATION: 86 MS
QTC CALCULATION (BEZET): 431 MS
R AXIS: 39 DEGREES
T AXIS: 59 DEGREES
VENTRICULAR RATE: 63 BPM

## 2024-02-13 ENCOUNTER — HOSPITAL ENCOUNTER (OUTPATIENT)
Dept: MAMMOGRAPHY | Facility: HOSPITAL | Age: 39
Discharge: HOME | End: 2024-02-13
Attending: OBSTETRICS & GYNECOLOGY
Payer: COMMERCIAL

## 2024-02-13 DIAGNOSIS — R92.2 INCONCLUSIVE MAMMOGRAPHY: ICD-10-CM

## 2024-02-13 PROCEDURE — 76642 ULTRASOUND BREAST LIMITED: CPT | Performed by: OBSTETRICS & GYNECOLOGY

## 2024-02-16 NOTE — PROGRESS NOTES
Released to [facility] and message from provider/results was viewed by patient.    Seen by patient Cammy Stanley on 2/13/2024 12:18 PM

## 2024-03-01 ENCOUNTER — OFFICE VISIT (OUTPATIENT)
Dept: SURGERY | Facility: CLINIC | Age: 39
End: 2024-03-01
Payer: COMMERCIAL

## 2024-03-01 VITALS
BODY MASS INDEX: 20.49 KG/M2 | WEIGHT: 120 LBS | HEIGHT: 64 IN | DIASTOLIC BLOOD PRESSURE: 56 MMHG | SYSTOLIC BLOOD PRESSURE: 98 MMHG

## 2024-03-01 DIAGNOSIS — N60.01 CYST OF RIGHT BREAST: ICD-10-CM

## 2024-03-01 DIAGNOSIS — N89.8 VAGINAL ITCHING: ICD-10-CM

## 2024-03-01 DIAGNOSIS — Z01.419 WOMEN'S ANNUAL ROUTINE GYNECOLOGICAL EXAMINATION: ICD-10-CM

## 2024-03-01 DIAGNOSIS — N76.0 VAGINITIS AND VULVOVAGINITIS, UNSPECIFIED: ICD-10-CM

## 2024-03-01 DIAGNOSIS — R10.2 PELVIC PAIN: Primary | ICD-10-CM

## 2024-03-01 PROCEDURE — 99396 PREV VISIT EST AGE 40-64: CPT | Performed by: OBSTETRICS & GYNECOLOGY

## 2024-03-01 NOTE — PROGRESS NOTES
GYN ANNUAL    3/1/2024  1:04 PM    Chief Complaint   Patient presents with    Abdominal Pain     Pt lower left sided abdominal pain that comes/goes     Follow - Up     On breast usn     Vaginal Problem     Pt c/o vaginal itching     Annual     Annual exam    .    HPI: Patient is a 38 year old  LMP 22 presents for annual gyn exam. Cycles regular. Reports concerns about LLQ sharp pains that have recently been improving along with vaginal itching extending to gluteal folds. Has tried OTC cortisone without much relief. Reviewed recent breast USN findings showing benign findings with recommendation for repeat imaging in 6 months. Discussed evaluation for possible left ovarian cyst with pelvic USN if pain persists along with use of topical antifungal and Desitin creams  x 7 days to external areas of irritation.  No other gynecologic issues or complaints.     OB History    Para Term  AB Living   3 3 3 0 0 3   SAB IAB Ectopic Multiple Live Births   0 0 0 0 3      # Outcome Date GA Lbr Eric/2nd Weight Sex Delivery Anes PTL Lv   3 Term 17 39w5d 08:15 / 00:14 7 lb 6.7 oz (3.365 kg) M NORMAL SPONT EPI N COTY      Birth Comments: Mother's age: 34  Maternal Blood Type: O Positive  : 3  Para: 3  Hearing Test: Pass  Bili T: 6.9   2 Term 13 40w0d  7 lb (3.175 kg) M NORMAL SPONT  N COTY   1 Term 03/15/12 40w0d  7 lb 10 oz (3.459 kg) F NORMAL SPONT  N COTY         GYN hx:    Hx Prior Abnormal Pap: No  Pap Date: 23  Pap Result Notes: WNL/-hpv  Follow Up Recommendation: Last mammogram 2023  CONTRACEPTION: Vasectomy  LAST MAMMOGRAM: 2023      Current Outpatient Medications   Medication Sig Dispense Refill    Magnesium 100 MG Oral Tab Take by mouth daily. 30 tablet 0    sertraline 50 MG Oral Tab Take 1 tablet (50 mg total) by mouth daily. 90 tablet 3    clonazePAM (KLONOPIN) 0.5 MG Oral Tab Take 1-2 tablets (0.5-1 mg total) by mouth daily as needed for Anxiety. 30 tablet 0     Omeprazole 10 MG Oral Capsule Delayed Release Take 1 capsule (10 mg total) by mouth daily. 90 capsule 3    onabotulinumtoxinA 100 units Injection Recon Soln 2 Units by Implant route.      cholecalciferol 50 MCG (2000 UT) Oral Tab Take 2.5 tablets (5,000 Units total) by mouth daily.      acidophilus-pectin Oral Cap Take 1 capsule by mouth daily.         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    OTHER SURGICAL HISTORY      benign right breast mass excision, two benign left breast tumors removed    OTHER SURGICAL HISTORY      wisdom teeth    OTHER SURGICAL HISTORY      wisdom teeth    UPPER GI ENDOSCOPY,DIAGNOSIS  2008    elsewhere, negative    UPPER GI ENDOSCOPY,DIAGNOSIS  5/29/15    esophageal ridges with distal esophageal rings, esophageal bx showed >50 eos/hpf, SB Bx negaitve, esophageal dilation 18 to 19 mm     Allergies   Allergen Reactions    Penicillins HIVES    Clindamycin PAIN     Gi upset     Family History   Problem Relation Age of Onset    Lipids Father     Hypertension Father     Other (MS) Father     Cancer Mother         Hodgkins    Other (Other) Mother         polymyocytis     Diabetes Maternal Grandfather     Diabetes Paternal Grandfather     Heart Disorder Paternal Grandfather     Cancer Paternal Grandmother         lung cancer and melanoma     Social History     Socioeconomic History    Marital status:     Number of children: 2   Occupational History    Occupation: dietician for bariatrics at [employer]     Employer: [employer]   Tobacco Use    Smoking status: Never     Passive exposure: Never    Smokeless tobacco: Never   Vaping Use    Vaping Use: Never used   Substance and Sexual Activity    Alcohol use: Yes     Alcohol/week: 1.0 standard drink of alcohol     Types: 1 Glasses of wine per week     Comment: social     Drug use: No    Sexual activity: Yes     Partners: Male     Birth control/protection: Vasectomy     Social History     Social History Narrative    No H/O abuse        ROS:     Review of Systems:  A comprehensive 10 point ROS was completed. All pertinent positives and negatives noted in the HPI        BP 98/56   Ht 64\"   Wt 120 lb (54.4 kg)   LMP 02/05/2024 (Approximate)   BMI 20.60 kg/m²     Exam:   GENERAL: well developed, well nourished, in no apparent distress  SKIN: no rashes, no lesions  HEENT: normal  LUNGS: respiration unlabored  CARDIOVASCULAR: no peripheral edema or varicosities, skin warm and dry  BREASTS: bilaterally nontender, no palpable masses, no nipple discharge, no skin changes, no axillary adenopathy  ABDOMEN: Soft, non distended; non tender, no masses  GYNE/:   External Genitalia: normal, no lesions, good perineal support  Urethra: meatus normal  Bladder: well supported  Vagina: normal mucosa, no lesions, mucoid discharge cultured  Uterus: normal size, mobile, nontender  Cervix:  normal os, no lesions or bleeding  Adnexa: normal size, bilaterally nontender, no palpable masses  Cul-de-sac: normal  R/V: normal perineum, no hemorrhoids  EXTREMITIES: nontender without edema      A/P: Patient is 38 year old female here for well-woman exam.     1. Women's annual routine gynecological examination  - Normal exam    2. LLQ intermittent pelvic pain  - US PELVIS (TRANSABDOMINAL AND TRANSVAGINAL) (CPT=76856/37118); Future    3. Vaginal itching  - Cervical cultures obtained  - Topical antifungal with Desitin x 7 days as needed externally      Total time spent = 20 minutes  >50% = face to face  discussion and coordination of care        3/1/2024  Devika De Jesus MD

## 2024-03-05 ENCOUNTER — TELEPHONE (OUTPATIENT)
Dept: OBGYN CLINIC | Facility: CLINIC | Age: 39
End: 2024-03-05

## 2024-03-05 DIAGNOSIS — N76.0 BACTERIAL VAGINOSIS: Primary | ICD-10-CM

## 2024-03-05 DIAGNOSIS — B96.89 BACTERIAL VAGINOSIS: Primary | ICD-10-CM

## 2024-03-05 RX ORDER — LEVOFLOXACIN 250 MG/1
250 TABLET, FILM COATED ORAL DAILY
Qty: 3 TABLET | Refills: 0 | Status: SHIPPED | OUTPATIENT
Start: 2024-03-05

## 2024-03-05 NOTE — TELEPHONE ENCOUNTER
LMTCB    VIKOR: +lactobacillus iners: 99.9%  Tx: levaquin 250mg daily x 3d plus baking soda soaks

## 2024-03-05 NOTE — TELEPHONE ENCOUNTER
RN spoke with pt, verified name and . RN provided pt with +VIKOR results and told her that rx would be sent to her preferred pharmacy (verified). RN provided admin instructions. Pt verbalized understanding and agreed with POC.

## 2024-03-08 ENCOUNTER — HOSPITAL ENCOUNTER (OUTPATIENT)
Age: 39
Discharge: HOME | End: 2024-03-08
Payer: COMMERCIAL

## 2024-03-08 VITALS
WEIGHT: 120 LBS | OXYGEN SATURATION: 98 % | SYSTOLIC BLOOD PRESSURE: 99 MMHG | TEMPERATURE: 98 F | RESPIRATION RATE: 18 BRPM | BODY MASS INDEX: 21 KG/M2 | HEART RATE: 67 BPM | DIASTOLIC BLOOD PRESSURE: 70 MMHG

## 2024-03-08 DIAGNOSIS — J02.9 VIRAL PHARYNGITIS: Primary | ICD-10-CM

## 2024-03-08 LAB — S PYO AG THROAT QL: NEGATIVE

## 2024-03-08 NOTE — ED PROVIDER NOTES
Patient Seen in: [facility]      History     Chief Complaint   Patient presents with    Sore Throat     Entered by patient     Stated Complaint: Sore Throat    Subjective:   This a 38-year-old female with below stated medical history.  Presents to immediate care for localized sore throat.  States she noticed on Monday her throat became red with white spots.  No fevers or chills.  No voice change or stridor.  No nasal congestion or other respiratory symptoms.  Taking Advil with some relief.     The history is provided by the patient.           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[employer]     Employer: [employer]   Tobacco Use    Smoking status: Never     Passive exposure: Never    Smokeless tobacco: Never   Vaping Use    Vaping Use: Never used   Substance and Sexual Activity    Alcohol use: Yes      Alcohol/week: 1.0 standard drink of alcohol     Types: 1 Glasses of wine per week     Comment: social     Drug use: No    Sexual activity: Yes     Partners: Male     Birth control/protection: Vasectomy   Other Topics Concern    Blood Transfusions No    Caffeine Concern No    Stress Concern No    Weight Concern No    Special Diet No    Exercise Yes    Seat Belt Yes   Social History Narrative    No H/O abuse               Review of Systems   Constitutional:  Negative for chills and fever.   HENT:  Positive for sore throat. Negative for congestion, trouble swallowing and voice change.    Respiratory:  Negative for cough, shortness of breath and wheezing.    Cardiovascular:  Negative for chest pain.   Gastrointestinal:  Negative for abdominal pain, constipation, diarrhea, nausea and vomiting.   Genitourinary:  Negative for dysuria.   Musculoskeletal:  Negative for back pain, neck pain and neck stiffness.   Skin:  Negative for rash.   Allergic/Immunologic: Negative for environmental allergies.   Neurological:  Negative for headaches.   Hematological:  Does not bruise/bleed easily.       Positive for stated complaint: Sore Throat  Other systems are as noted in HPI.  Constitutional and vital signs reviewed.      All other systems reviewed and negative except as noted above.    Physical Exam     ED Triage Vitals [03/08/24 0820]   BP 99/70   Pulse 67   Resp 18   Temp 97.6 °F (36.4 °C)   Temp src Temporal   SpO2 98 %   O2 Device None (Room air)       Current:BP 99/70   Pulse 67   Temp 97.6 °F (36.4 °C) (Temporal)   Resp 18   Wt 54.4 kg   LMP 03/02/2024 (Approximate)   SpO2 98%   BMI 20.60 kg/m²         Physical Exam  Vitals and nursing note reviewed.   Constitutional:       General: She is not in acute distress.     Appearance: Normal appearance. She is well-developed. She is not ill-appearing, toxic-appearing or diaphoretic.   HENT:      Head: Normocephalic and atraumatic.      Right Ear: Tympanic membrane, ear canal  and external ear normal. No drainage, swelling or tenderness. No middle ear effusion. Tympanic membrane is not erythematous.      Left Ear: Tympanic membrane, ear canal and external ear normal. No drainage, swelling or tenderness.  No middle ear effusion. Tympanic membrane is not erythematous.      Nose: Nose normal. No congestion or rhinorrhea.      Mouth/Throat:      Mouth: Mucous membranes are moist. No oral lesions.      Pharynx: Oropharynx is clear. Uvula midline. Posterior oropharyngeal erythema present. No pharyngeal swelling, oropharyngeal exudate or uvula swelling.      Tonsils: No tonsillar exudate or tonsillar abscesses. 0 on the right. 0 on the left.   Eyes:      General:         Right eye: No discharge.         Left eye: No discharge.      Extraocular Movements: Extraocular movements intact.      Conjunctiva/sclera: Conjunctivae normal.   Cardiovascular:      Rate and Rhythm: Normal rate and regular rhythm.      Heart sounds: Normal heart sounds. No murmur heard.  Pulmonary:      Effort: Pulmonary effort is normal. No respiratory distress.      Breath sounds: Normal breath sounds. No stridor. No wheezing, rhonchi or rales.   Musculoskeletal:      Cervical back: Neck supple.      Right lower leg: No edema.      Left lower leg: No edema.   Skin:     General: Skin is warm and dry.      Capillary Refill: Capillary refill takes less than 2 seconds.      Findings: No rash.   Neurological:      Mental Status: She is alert and oriented to person, place, and time.   Psychiatric:         Mood and Affect: Mood normal.         Behavior: Behavior normal.             ED Course     Labs Reviewed   POCT RAPID STREP - Normal             Rapid strep           MDM        Vital signs stable.  Patient is well-appearing and nontoxic looking.  Presents to immediate care for localized sore throat.    Differential diagnose include but is not limited to viral pharyngitis, strep pharyngitis, COVID, mono, less likely  PTA    Posterior pharynx is erythemic with no evidence of exudates or PTA.  Rapid strep is negative.  Patient is on day 4 of symptoms.  She denies any coughing or nasal congestion.  No suspicion for COVID.    Clinically also does appear viral.    DC home.  Symptomatic and supportive care discussed.  Reasons to seek emergent care reviewed.  Recommended follow-up with PCP.  She verbalized understanding, and agreed with plan of care.  All questions answered.                                  Medical Decision Making      Disposition and Plan     Clinical Impression:  1. Viral pharyngitis         Disposition:  Discharge  3/8/2024  8:39 am    Follow-up:  Mairela Roblero MD  [address] [place] [id number]  [phone]    In 1 week            Medications Prescribed:  Discharge Medication List as of 3/8/2024  8:44 AM

## 2024-03-08 NOTE — DISCHARGE INSTRUCTIONS
Your strep test was negative.  Warm salt water gargles and throat lozenges.  Follow closely with your primary.

## 2024-04-03 ENCOUNTER — TELEPHONE (OUTPATIENT)
Dept: OBGYN CLINIC | Facility: CLINIC | Age: 39
End: 2024-04-03

## 2024-05-08 ENCOUNTER — PATIENT MESSAGE (OUTPATIENT)
Dept: INTERNAL MEDICINE CLINIC | Facility: CLINIC | Age: 39
End: 2024-05-08

## 2024-05-08 DIAGNOSIS — R00.2 PALPITATIONS: Primary | ICD-10-CM

## 2024-05-08 NOTE — TELEPHONE ENCOUNTER
From: Cammy Stanley  To: Mariela Roblero  Sent: 5/8/2024 11:14 AM CDT  Subject: Monitor     Hello Dr. Roblero ordered a Holter monitor for me last year and I never scheduled the appointment because my palpitations were better but over the last month they have started up again. Can she please re-order it and I can go ahead and get that done? Thank you!    Cammy

## 2024-05-23 ENCOUNTER — HOSPITAL ENCOUNTER (OUTPATIENT)
Dept: CV DIAGNOSTICS | Facility: HOSPITAL | Age: 39
Discharge: HOME | End: 2024-05-23
Attending: INTERNAL MEDICINE

## 2024-05-23 DIAGNOSIS — R00.2 PALPITATIONS: ICD-10-CM

## 2024-05-23 PROCEDURE — 93226 XTRNL ECG REC<48 HR SCAN A/R: CPT | Performed by: INTERNAL MEDICINE

## 2024-05-23 PROCEDURE — 93225 XTRNL ECG REC<48 HRS REC: CPT | Performed by: INTERNAL MEDICINE

## 2024-07-31 ENCOUNTER — TELEPHONE (OUTPATIENT)
Dept: OBGYN CLINIC | Facility: CLINIC | Age: 39
End: 2024-07-31

## 2024-07-31 DIAGNOSIS — N60.01 CYST OF RIGHT BREAST: Primary | ICD-10-CM

## 2024-07-31 NOTE — TELEPHONE ENCOUNTER
Giana from [facility] is calling requesting a referral for a diagnostic bilateral mammogram with ultrasound due to patient is due for her 6 month check up. Please assist.

## 2024-09-03 ENCOUNTER — OFFICE VISIT (OUTPATIENT)
Dept: INTERNAL MEDICINE CLINIC | Facility: CLINIC | Age: 39
End: 2024-09-03

## 2024-09-03 VITALS
HEIGHT: 64 IN | HEART RATE: 85 BPM | WEIGHT: 122 LBS | SYSTOLIC BLOOD PRESSURE: 101 MMHG | BODY MASS INDEX: 20.83 KG/M2 | DIASTOLIC BLOOD PRESSURE: 68 MMHG

## 2024-09-03 DIAGNOSIS — E55.9 VITAMIN D DEFICIENCY: ICD-10-CM

## 2024-09-03 DIAGNOSIS — Z00.00 PHYSICAL EXAM, ANNUAL: Primary | ICD-10-CM

## 2024-09-03 PROCEDURE — 99396 PREV VISIT EST AGE 40-64: CPT | Performed by: INTERNAL MEDICINE

## 2024-09-03 RX ORDER — CLOBETASOL PROPIONATE 0.5 MG/G
CREAM TOPICAL
COMMUNITY
Start: 2024-08-30

## 2024-09-03 NOTE — PROGRESS NOTES
Cammy Stanley is a 39 year old female.  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  Has been taking clonazepam for years           HISTORY  7/2022  New patient-used to see Dr. Heaton  C/C establish care  C/O needs refills            PMH  Post partum anxiety - was on sertraline 100mg and now tapering   Trouble sleeping when traveling and on klonopin prn   Eosinophilic esophagitis - dr pollock - dmg - on omeprazole      Lives  and 3 kids -girl and 2 boys , dietician for [employer]        Current Outpatient Medications   Medication Sig Dispense Refill    clobetasol 0.05 % External Cream Apply to AA BID for 2 weeks then taper to PRN.      tretinoin 0.025 % External Cream Apply a pea size amount to the entire face nightly.      sertraline 50 MG Oral Tab Take 1.5 tablets (75 mg total) by mouth daily. 135 tablet 1    Magnesium 100 MG Oral Tab Take by mouth daily. 30 tablet 0    clonazePAM (KLONOPIN) 0.5 MG Oral Tab Take 1-2 tablets (0.5-1 mg total) by mouth daily as needed for Anxiety. 30 tablet 0    Omeprazole 10 MG Oral Capsule Delayed Release Take 1 capsule (10 mg total) by mouth daily. 90 capsule 3    onabotulinumtoxinA 100 units Injection Recon Soln 2  Units by Implant route.      cholecalciferol 50 MCG (2000 UT) Oral Tab Take 2.5 tablets (5,000 Units total) by mouth daily.      acidophilus-pectin Oral Cap Take 1 capsule by mouth daily.        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    Other surgical history      benign right breast mass excision, two benign left breast tumors removed    Other surgical history      wisdom teeth    Other surgical history      wisdom teeth    Upper gi endoscopy,diagnosis  2008    elsewhere, negative    Upper gi endoscopy,diagnosis  5/29/15    esophageal ridges with distal esophageal rings, esophageal bx showed >50 eos/hpf, SB Bx negaitve, esophageal dilation 18 to 19 mm      Social History:  Social History     Socioeconomic History    Marital status:     Number of children: 2   Occupational History    Occupation: dietician for bariatrics at [employer]     Employer: [employer]   Tobacco Use    Smoking status: Never     Passive exposure: Never    Smokeless tobacco: Never   Vaping Use    Vaping status: Never Used   Substance and Sexual Activity    Alcohol use: Yes     Alcohol/week: 1.0 standard drink of alcohol     Types: 1 Glasses of wine per week     Comment: social     Drug use: No    Sexual activity: Yes     Partners: Male     Birth control/protection: Vasectomy   Other Topics Concern    Blood Transfusions No    Caffeine Concern No    Stress Concern No    Weight Concern No    Special Diet No    Exercise Yes    Seat Belt Yes   Social History Narrative    No H/O abuse          REVIEW OF SYSTEMS:   GENERAL HEALTH: No fevers, chills, sweats, fatigue  VISION: No recent vision problems, blurry vision or double vision  HEENT: No decreased hearing ear pain nasal congestion or sore throat  SKIN: denies any unusual skin lesions or rashes  RESPIRATORY: denies shortness of breath, cough, wheezing  CARDIOVASCULAR: denies chest pain on exertion, palpitations, swelling in feet  GI:+ abdominal pain and + heartburn, nausea or vomiting  : No Pain on urination, change in the color of urine, discharge, urinating frequently  MUS: No back pain, joint pain, muscle pain  NEURO: denies headaches , + anxiety, no depression    EXAM:   /68   Pulse 85   Ht 5' 4\" (1.626 m)   Wt 122 lb (55.3 kg)   LMP 03/02/2024 (Approximate)   BMI 20.94 kg/m²   GENERAL: well developed, well nourished,in no apparent distress  SKIN: no rashes,no suspicious lesions  HEENT: atraumatic, normocephalic,ears and throat are clear, no frontal maxillary sinus tenderness, pupils equal reactive to light bilaterally, extraocular muscles intact  NECK: supple,no adenopathy, nontender   LUNGS: clear to auscultation, no wheeze  CARDIO: RRR without murmur  GI: good BS's,no masses or tenderness  EXTREMITIES: no cyanosis, or edema  Breast exam done by GYN    ASSESSMENT AND PLAN:   Diagnoses and all orders for this visit:    Physical exam, annual  -     Comp Metabolic Panel (14); Future  -     Lipid Panel; Future  -     Assay, Thyroid Stim Hormone; Future  -     Vitamin D; Future  -     CBC, Platelet; No Differential; Future    Vitamin D deficiency  -     Vitamin D; Future      Advised patient to watch what she eats and exercise, seatbelt use no texting driving, sunscreen use advised      Preventative medicine   Pap- dr sanders 5/2023  GI dr murphy -upper endoscopy 6/2023 eosinophilic esophagitis  Mammogram -ordered by GYN-reminded patient to schedule  Labs 8/2023   Tdap-patient declined for today but will come back for    The patient  indicates understanding of these issues and agrees to the plan.  No follow-ups on file.

## 2024-10-10 ENCOUNTER — PATIENT MESSAGE (OUTPATIENT)
Dept: INTERNAL MEDICINE CLINIC | Facility: CLINIC | Age: 39
End: 2024-10-10

## 2024-10-10 ENCOUNTER — LAB ENCOUNTER (OUTPATIENT)
Dept: LAB | Age: 39
End: 2024-10-10
Attending: INTERNAL MEDICINE
Payer: COMMERCIAL

## 2024-10-10 ENCOUNTER — HOSPITAL ENCOUNTER (OUTPATIENT)
Dept: MAMMOGRAPHY | Facility: HOSPITAL | Age: 39
Discharge: HOME | End: 2024-10-10
Attending: OBSTETRICS & GYNECOLOGY
Payer: COMMERCIAL

## 2024-10-10 DIAGNOSIS — Z82.49 FAMILY HISTORY OF CORONARY ARTERY DISEASE: ICD-10-CM

## 2024-10-10 DIAGNOSIS — Z00.00 PHYSICAL EXAM, ANNUAL: Primary | ICD-10-CM

## 2024-10-10 DIAGNOSIS — N60.01 CYST OF RIGHT BREAST: ICD-10-CM

## 2024-10-10 DIAGNOSIS — E55.9 VITAMIN D DEFICIENCY: ICD-10-CM

## 2024-10-10 DIAGNOSIS — Z00.00 PHYSICAL EXAM, ANNUAL: ICD-10-CM

## 2024-10-10 LAB
ALBUMIN SERPL-MCNC: 4.7 G/DL (ref 3.2–4.8)
ALBUMIN/GLOB SERPL: 2 {RATIO} (ref 1–2)
ALP LIVER SERPL-CCNC: 45 U/L
ALT SERPL-CCNC: 14 U/L
ANION GAP SERPL CALC-SCNC: 4 MMOL/L (ref 0–18)
AST SERPL-CCNC: 22 U/L (ref ?–34)
BILIRUB SERPL-MCNC: 0.6 MG/DL (ref 0.3–1.2)
BUN BLD-MCNC: 13 MG/DL (ref 9–23)
CALCIUM BLD-MCNC: 9.3 MG/DL (ref 8.7–10.4)
CHLORIDE SERPL-SCNC: 106 MMOL/L (ref 98–112)
CHOLEST SERPL-MCNC: 179 MG/DL (ref ?–200)
CO2 SERPL-SCNC: 29 MMOL/L (ref 21–32)
CREAT BLD-MCNC: 0.79 MG/DL
EGFRCR SERPLBLD CKD-EPI 2021: 96 ML/MIN/1.73M2 (ref 60–?)
ERYTHROCYTE [DISTWIDTH] IN BLOOD BY AUTOMATED COUNT: 13.8 %
FASTING PATIENT LIPID ANSWER: YES
FASTING STATUS PATIENT QL REPORTED: YES
GLOBULIN PLAS-MCNC: 2.3 G/DL (ref 2–3.5)
GLUCOSE BLD-MCNC: 97 MG/DL (ref 70–99)
HCT VFR BLD AUTO: 39 %
HDLC SERPL-MCNC: 66 MG/DL (ref 40–59)
HGB BLD-MCNC: 12.9 G/DL
LDLC SERPL CALC-MCNC: 100 MG/DL (ref ?–100)
MCH RBC QN AUTO: 26.2 PG (ref 26–34)
MCHC RBC AUTO-ENTMCNC: 33.1 G/DL (ref 31–37)
MCV RBC AUTO: 79.3 FL
NONHDLC SERPL-MCNC: 113 MG/DL (ref ?–130)
OSMOLALITY SERPL CALC.SUM OF ELEC: 288 MOSM/KG (ref 275–295)
PLATELET # BLD AUTO: 309 10(3)UL (ref 150–450)
POTASSIUM SERPL-SCNC: 4.1 MMOL/L (ref 3.5–5.1)
PROT SERPL-MCNC: 7 G/DL (ref 5.7–8.2)
RBC # BLD AUTO: 4.92 X10(6)UL
SODIUM SERPL-SCNC: 139 MMOL/L (ref 136–145)
TRIGL SERPL-MCNC: 71 MG/DL (ref 30–149)
TSI SER-ACNC: 1.63 MIU/ML (ref 0.55–4.78)
VIT D+METAB SERPL-MCNC: 52.6 NG/ML (ref 30–100)
VLDLC SERPL CALC-MCNC: 12 MG/DL (ref 0–30)
WBC # BLD AUTO: 7.4 X10(3) UL (ref 4–11)

## 2024-10-10 PROCEDURE — 84443 ASSAY THYROID STIM HORMONE: CPT

## 2024-10-10 PROCEDURE — 85027 COMPLETE CBC AUTOMATED: CPT

## 2024-10-10 PROCEDURE — 77066 DX MAMMO INCL CAD BI: CPT | Performed by: OBSTETRICS & GYNECOLOGY

## 2024-10-10 PROCEDURE — 76642 ULTRASOUND BREAST LIMITED: CPT | Performed by: OBSTETRICS & GYNECOLOGY

## 2024-10-10 PROCEDURE — 80053 COMPREHEN METABOLIC PANEL: CPT

## 2024-10-10 PROCEDURE — 77062 BREAST TOMOSYNTHESIS BI: CPT | Performed by: OBSTETRICS & GYNECOLOGY

## 2024-10-10 PROCEDURE — 80061 LIPID PANEL: CPT

## 2024-10-10 PROCEDURE — 82306 VITAMIN D 25 HYDROXY: CPT

## 2024-10-10 PROCEDURE — 36415 COLL VENOUS BLD VENIPUNCTURE: CPT

## 2024-10-11 NOTE — PROGRESS NOTES
Released to [employer] and message from provider/results was viewed by patient.    Seen by patient Cammy NICK Stanley on 10/11/2024  9:47 AM

## 2024-10-11 NOTE — TELEPHONE ENCOUNTER
[See Lab results from order placed yesterday, 10/10/24]    Dr. Roblero - please review [employer] message and advise. Please respond directly to the patient if no additional staff support is required.

## 2024-10-17 ENCOUNTER — LAB ENCOUNTER (OUTPATIENT)
Dept: LAB | Age: 39
End: 2024-10-17
Attending: INTERNAL MEDICINE
Payer: COMMERCIAL

## 2024-10-17 DIAGNOSIS — Z82.49 FAMILY HISTORY OF CORONARY ARTERY DISEASE: ICD-10-CM

## 2024-10-17 DIAGNOSIS — Z00.00 PHYSICAL EXAM, ANNUAL: ICD-10-CM

## 2024-10-17 LAB
EST. AVERAGE GLUCOSE BLD GHB EST-MCNC: 111 MG/DL (ref 68–126)
HBA1C MFR BLD: 5.5 % (ref ?–5.7)
INSULIN SERPL-ACNC: 5.9 MU/L (ref 3–25)

## 2024-10-17 PROCEDURE — 36415 COLL VENOUS BLD VENIPUNCTURE: CPT

## 2024-10-17 PROCEDURE — 83525 ASSAY OF INSULIN: CPT

## 2024-10-17 PROCEDURE — 83036 HEMOGLOBIN GLYCOSYLATED A1C: CPT

## 2024-10-17 PROCEDURE — 83695 ASSAY OF LIPOPROTEIN(A): CPT

## 2024-10-18 LAB — LIPOPROTEIN (A): 52.1 NMOL/L

## 2024-10-27 DIAGNOSIS — K20.0 EOSINOPHILIC ESOPHAGITIS: ICD-10-CM

## 2024-10-29 RX ORDER — OMEPRAZOLE 10 MG/1
10 CAPSULE, DELAYED RELEASE ORAL DAILY
Qty: 90 CAPSULE | Refills: 3 | Status: SHIPPED | OUTPATIENT
Start: 2024-10-29

## 2024-10-29 NOTE — TELEPHONE ENCOUNTER
Refill passed per [facility] protocols.    Requested Prescriptions   Pending Prescriptions Disp Refills    OMEPRAZOLE 10 MG Oral Capsule Delayed Release [Pharmacy Med Name: OMEPRAZOLE DR 10 MG CAPSULE] 90 capsule 3     Sig: TAKE 1 CAPSULE BY MOUTH EVERY DAY       Gastrointestional Medication Protocol Passed - 10/29/2024  4:00 PM        Passed - In person appointment or virtual visit in the past 12 mos or appointment in next 3 mos     Recent Outpatient Visits              1 month ago Physical exam, annual    [facility]    [facility] Visit    8 months ago Pelvic pain    [facility]    Office Visit    [facility]    [facility]    1 year ago Routine cervical smear    [facility] [place] [facility] - [facility]/Devika Valencia MD    Office Visit    1 year ago [facility] es[facility]itis    [facility]    [facility] Visit

## 2025-04-11 ENCOUNTER — OFFICE VISIT (OUTPATIENT)
Dept: SURGERY | Facility: CLINIC | Age: 40
End: 2025-04-11
Payer: COMMERCIAL

## 2025-04-11 VITALS
WEIGHT: 124.38 LBS | BODY MASS INDEX: 21.24 KG/M2 | HEIGHT: 64 IN | SYSTOLIC BLOOD PRESSURE: 114 MMHG | DIASTOLIC BLOOD PRESSURE: 60 MMHG

## 2025-04-11 DIAGNOSIS — D24.1 FIBROADENOMA OF RIGHT BREAST: ICD-10-CM

## 2025-04-11 DIAGNOSIS — R92.30 DENSE BREASTS: ICD-10-CM

## 2025-04-11 DIAGNOSIS — Z01.419 WOMEN'S ANNUAL ROUTINE GYNECOLOGICAL EXAMINATION: Primary | ICD-10-CM

## 2025-04-11 PROBLEM — R10.2 PELVIC PAIN: Status: RESOLVED | Noted: 2021-10-20 | Resolved: 2025-04-11

## 2025-04-11 PROBLEM — N60.01 CYST OF RIGHT BREAST: Status: RESOLVED | Noted: 2024-03-01 | Resolved: 2025-04-11

## 2025-04-11 PROBLEM — F41.8 POSTPARTUM ANXIETY (HCC): Status: RESOLVED | Noted: 2018-04-24 | Resolved: 2025-04-11

## 2025-04-11 PROBLEM — N89.8 VAGINAL ITCHING: Status: RESOLVED | Noted: 2020-06-19 | Resolved: 2025-04-11

## 2025-04-11 PROCEDURE — 99459 PELVIC EXAMINATION: CPT | Performed by: OBSTETRICS & GYNECOLOGY

## 2025-04-11 PROCEDURE — 99396 PREV VISIT EST AGE 40-64: CPT | Performed by: OBSTETRICS & GYNECOLOGY

## 2025-04-11 NOTE — PROGRESS NOTES
GYN ANNUAL    2025  12:42 PM    Chief Complaint   Patient presents with    Annual     Annual exam (Reviewed Preventative/Wellness form with patient.    .    HPI: Patient is a 39 year old  LMP 3/30/25 presents for annual gyn exam. Cycles starting to get closer together every 21-25 days but none skipped and no pain. No other gynecologic issues or concerns.     OB History    Para Term  AB Living   3 3 3 0 0 3   SAB IAB Ectopic Multiple Live Births   0 0 0 0 3      # Outcome Date GA Lbr Eric/2nd Weight Sex Type Anes PTL Lv   3 Term 17 39w5d 08:15 / 00:14 7 lb 6.7 oz (3.365 kg) M NORMAL SPONT EPI N COTY      Birth Comments: Mother's age: 34  Maternal Blood Type: O Positive  : 3  Para: 3  Hearing Test: Pass  Bili T: 6.9   2 Term 13 40w0d  7 lb (3.175 kg) M NORMAL SPONT  N COTY   1 Term 03/15/12 40w0d  7 lb 10 oz (3.459 kg) F NORMAL SPONT  N COTY         GYN hx:    Hx Prior Abnormal Pap: No  Pap Date: 23  Pap Result Notes: WNL/-hpv  Follow Up Recommendation: Last mammogram 10/10/2024 with right brest usn = dense breasts, stable right fibroadenoma  CONTRACEPTION: Vasectomy    Current Medications[1]    Past Medical History[2]  Past Surgical History[3]  Allergies[4]  Family History[5]  Set as collapsible by default.[6]  Social History     Social History Narrative    No H/O abuse        ROS:     Review of Systems:  A comprehensive 10 point ROS was completed. All pertinent positives and negatives noted in the HPI        /60   Ht 64\"   Wt 124 lb 6.4 oz (56.4 kg)   LMP 2025 (Exact Date)   BMI 21.35 kg/m²     Exam:   GENERAL: well developed, well nourished, in no apparent distress  SKIN: no rashes, no lesions  HEENT: normal  LUNGS: respiration unlabored  CARDIOVASCULAR: no peripheral edema or varicosities, skin warm and dry  BREASTS: bilaterally nontender, prominent fibroglandular nodularities at lower outer left breast unchanged, no nipple discharge, no skin changes,  no axillary adenopathy  ABDOMEN: soft, non distended; non tender, no masses  GYNE/:   External Genitalia: normal, no lesions, good perineal support  Urethra: meatus normal  Bladder: well supported  Vagina: normal mucosa, no lesions, no discharge   Uterus: normal size, mobile, nontender  Cervix:  normal os, no lesions or bleeding  Adnexa: normal size, bilaterally nontender, no palpable masses  Cul-de-sac: normal  R/V: normal perineum, no hemorrhoids  EXTREMITIES: nontender without edema      A/P: Patient is 39 year old female here for well-woman exam.     1. Women's annual routine gynecological examination  - Normal exam        4/11/2025  Devika De Jesus MD                    [1]   Current Outpatient Medications   Medication Sig Dispense Refill    sertraline (ZOLOFT) 100 MG Oral Tab Take 1 tablet (100 mg total) by mouth every morning. 30 tablet 1    Omeprazole 10 MG Oral Capsule Delayed Release Take 1 capsule (10 mg total) by mouth daily. 90 capsule 3    tretinoin 0.025 % External Cream Apply a pea size amount to the entire face nightly.      Magnesium 100 MG Oral Tab Take by mouth daily. 30 tablet 0    clonazePAM (KLONOPIN) 0.5 MG Oral Tab Take 1-2 tablets (0.5-1 mg total) by mouth daily as needed for Anxiety. 30 tablet 0    onabotulinumtoxinA 100 units Injection Recon Soln 2 Units by Implant route.      cholecalciferol 50 MCG (2000 UT) Oral Tab Take 2.5 tablets (5,000 Units total) by mouth daily.      acidophilus-pectin Oral Cap Take 1 capsule by mouth daily.     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          Other surgical history      benign right breast mass excision, two benign left breast tumors removed    Other surgical history      wisdom teeth    Other surgical history      wisdom teeth    Upper gi endoscopy,diagnosis  2008    elsewhere, negative    Upper gi endoscopy,diagnosis  2015    esophageal ridges with distal esophageal rings, esophageal bx showed >50 eos/hpf, SB Bx negaitve, esophageal dilation 18 to 19 mm   [4]   Allergies  Allergen Reactions    Penicillins HIVES    Clindamycin PAIN     Gi upset   [5]   Family History  Problem Relation Age of Onset    Lipids Father     Hypertension Father     Other (MS) Father     Cancer Mother         Hodgkins    Other (Other) Mother         polymyocytis     Diabetes Maternal Grandfather     Diabetes Paternal Grandfather     Heart Disorder Paternal Grandfather     Cancer Paternal Grandmother         lung cancer and melanoma   [6]   Social History  Socioeconomic History    Marital status:     Number of children: 2   Occupational History    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

## 2025-05-13 ENCOUNTER — HOSPITAL ENCOUNTER (OUTPATIENT)
Dept: CT IMAGING | Age: 40
Discharge: HOME | End: 2025-05-13
Attending: INTERNAL MEDICINE

## 2025-05-13 DIAGNOSIS — Z13.6 SCREENING FOR HEART DISEASE: ICD-10-CM

## (undated) DEVICE — Device: Brand: DUAL NARE NASAL CANNULAE FEMALE LUER CON 7FT O2 TUBE

## (undated) DEVICE — YANKAUER SUCTION INSTRUMENT NO CONTROL VENT, BULB TIP, CLEAR: Brand: YANKAUER

## (undated) DEVICE — KIT CLEAN ENDOKIT 1.1OZ GOWNX2

## (undated) DEVICE — FORCEP RADIAL JAW 4

## (undated) DEVICE — MEDI-VAC NON-CONDUCTIVE SUCTION TUBING: Brand: CARDINAL HEALTH

## (undated) DEVICE — CONMED SCOPE SAVER BITE BLOCK, 20X27 MM: Brand: SCOPE SAVER

## (undated) DEVICE — MEDI-VAC NON-CONDUCTIVE SUCTION TUBING 6MM X 1.8M (6FT.) L: Brand: CARDINAL HEALTH

## (undated) DEVICE — KIT ENDO ORCAPOD 160/180/190

## (undated) DEVICE — 60 ML SYRINGE REGULAR TIP: Brand: MONOJECT

## (undated) NOTE — LETTER
No referring provider defined for this encounter. 10/08/20        Patient: Valentino Stanley   YOB: 1985   Date of Visit: 10/8/2020       Dear  Dr. Isrrael Velásquez MD,      Thank you for referring Valentino Stanley to my practice.   Please fi

## (undated) NOTE — LETTER
[address] [facility] [address] [place], [place]  Authorization for Surgical Operation and Procedure                                                                                           I hereby authorize Michael Null MD, my physician and his/her assistants (if applicable), which may include medical students, residents, and/or fellows, to perform the following surgical operation/ procedure and administer such anesthesia as may be determined necessary by my physician: Operation/Procedure name (s) ESOPHAGOGASTRODUODENOSCOPY (EGD) on Devika ROMERO [facility]   2. I recognize that during the surgical operation/procedure, unforeseen conditions may necessitate additional or different procedures than those listed above. I, therefore, further authorize and request that the above-named surgeon, assistants, or designees perform such procedures as are, in their judgment, necessary and desirable. 3.   My surgeon/physician has discussed prior to my surgery the potential benefits, risks and side effects of this procedure; the likelihood of achieving goals; and potential problems that might occur during recuperation. They also discussed reasonable alternatives to the procedure, including risks, benefits, and side effects related to the alternatives and risks related to not receiving this procedure. I have had all my questions answered and I acknowledge that no guarantee has been made as to the result that may be obtained. 4.   Should the need arise during my operation/procedure, which includes change of level of care prior to discharge, I also consent to the administration of blood and/or blood products. Further, I understand that despite careful testing and screening of blood or blood products by collecting agencies, I may still be subject to ill effects as a result of receiving a blood transfusion and/or blood products.   The following are some, but not all, of the potential risks that can occur: fever and allergic reactions, hemolytic reactions, transmission of diseases such as Hepatitis, AIDS and Cytomegalovirus (CMV) and fluid overload. In the event that I wish to have an autologous transfusion of my own blood, or a directed donor transfusion, I will discuss this with my physician. Check only if Refusing Blood or Blood Products  I understand refusal of blood or blood products as deemed necessary by my physician may have serious consequences to my condition to include possible death. I hereby assume responsibility for my refusal and release the hospital, its personnel, and my physicians from any responsibility for the consequences of my refusal.    o  Refuse   5. I authorize the use of any specimen, organs, tissues, body parts or foreign objects that may be removed from my body during the operation/procedure for diagnosis, research or teaching purposes and their subsequent disposal by hospital authorities. I also authorize the release of specimen test results and/or written reports to my treating physician on the hospital medical staff or other referring or consulting physicians involved in my care, at the discretion of the Pathologist or my treating physician. 6.   I consent to the photographing or videotaping of the operations or procedures to be performed, including appropriate portions of my body for medical, scientific, or educational purposes, provided my identity is not revealed by the pictures or by descriptive texts accompanying them. If the procedure has been photographed/videotaped, the surgeon will obtain the original picture, image, videotape or CD. The hospital will not be responsible for storage, release or maintenance of the picture, image, tape or CD.    7.   I consent to the presence of a  or observers in the operating room as deemed necessary by my physician or their designees.     8.   I recognize that in the event my procedure results in extended X-Ray/fluoroscopy time, I may develop a skin reaction. 9. If I have a Do Not Attempt Resuscitation (DNAR) order in place, that status will be suspended while in the operating room, procedural suite, and during the recovery period unless otherwise explicitly stated by me (or a person authorized to consent on my behalf). The surgeon or my attending physician will determine when the applicable recovery period ends for purposes of reinstating the DNAR order. 10. Patients having a sterilization procedure: I understand that if the procedure is successful the results will be permanent and it will therefore be impossible for me to inseminate, conceive, or bear children. I also understand that the procedure is intended to result in sterility, although the result has not been guaranteed. 11. I acknowledge that my physician has explained sedation/analgesia administration to me including the risk and benefits I consent to the administration of sedation/analgesia as may be necessary or desirable in the judgment of my physician. I CERTIFY THAT I HAVE READ AND FULLY UNDERSTAND THE ABOVE CONSENT TO OPERATION and/or OTHER PROCEDURE.     _________________________________________ _________________________________     ___________________________________  Signature of Patient     Signature of Responsible Person                   Printed Name of Responsible Person                              _________________________________________ ______________________________        ___________________________________  Signature of Witness         Date  Time         Relationship to Patient    STATEMENT OF PHYSICIAN My signature below affirms that prior to the time of the procedure; I have explained to the patient and/or his/her legal representative, the risks and benefits involved in the proposed treatment and any reasonable alternative to the proposed treatment.  I have also explained the risks and benefits involved in refusal of the proposed treatment and alternatives to the proposed treatment and have answered the patient's questions.  If I have a significant financial interest in a co-management agreement or a significant financial interest in any product or implant, or other significant relationship used in this procedure/surgery, I have disclosed this and had a discussion with my patient.     _______________________________________________________________ _____________________________  Eugene Vasquez  )                                                                                         (Date)                                   (Time)  Patient Name: Jessica Priest Lizeth    : 1985   Printed: 6/15/2023      Medical Record #: [id number]                                              Page 1 of 1

## (undated) NOTE — MR AVS SNAPSHOT
After Visit Summary   6/19/2020    Liv Irene    MRN: [id number]           Visit Information     Date & Time  6/19/2020 10:00 AM Provider  Edu Bruner MD [address] [facility], [facility] Dept.  Phone If you receive a survey from [employer], please take a few minutes to complete it and provide feedback. We strive to deliver the best patient experience and are looking for ways to make improvements. Your feedback will help us do so.  For more information EMERGENCY ROOM Life-threatening emergencies needing immediate intervention at a hospital emergency room.  Average cost  $2,300*   *Cost varies based on your insurance coverage  For more information about hours, locations or appointment options available at

## (undated) NOTE — MR AVS SNAPSHOT
[facility] at [address] [place] [address] Road [address] [phone]               Thank you for choosing us for your health care visit with Marrion Rubinstein, MD.  We are glad to serve you and happy to provide Today's Orders     H PYLORI, IGM, IGG, IGA AB [[id number]]    Complete by:  As directed    Assoc Dx:  LLQ abdominal pain [R10.32]                 Follow-up Instructions     Return in about 2 weeks (around 1/24/2017).          Results of Recent Testin Eosinophil Absolute 0.1 0.0-0.7 K/UL    Basophil Absolute 0.0 0.0-0.2 K/UL                  MyChart     Visit MyChart  You can access your MyChart to more actively manage your health care and view more details from this visit by going to [email or web address]

## (undated) NOTE — ED AVS SNAPSHOT
[facility] in [address] [address] [address] [facility] [address]    Phone:  [phone]    Fax:  [phone]           Ronald Munguia   MRN: [id number]    Department:  [facility] in [address] [place] [address]   Date of Visit: benefit level being available to you or other limited reimbursement. The physician may seek payment directly from you for amounts other than your deductible, co-payment, or co-insurance and for other services not covered under your health insurance plan. If you believe that any of the medications or instructions on this list is different from what your Primary Care doctor has instructed you - please continue to take your medications as instructed by your Primary Care doctor until you can check with your do can help with your Affordable Care Act coverage, as well as all types of Medicaid plans. To get signed up and covered, please call [phone] and ask to get set up for an insurance coverage that is in-network with [facility] Alanis